# Patient Record
Sex: MALE | Race: WHITE | NOT HISPANIC OR LATINO | URBAN - METROPOLITAN AREA
[De-identification: names, ages, dates, MRNs, and addresses within clinical notes are randomized per-mention and may not be internally consistent; named-entity substitution may affect disease eponyms.]

---

## 2017-01-28 ENCOUNTER — COMMUNICATION - HEALTHEAST (OUTPATIENT)
Dept: NEUROLOGY | Facility: CLINIC | Age: 77
End: 2017-01-28

## 2017-01-28 DIAGNOSIS — G30.9 DEMENTIA OF THE ALZHEIMER'S TYPE (H): ICD-10-CM

## 2017-01-28 DIAGNOSIS — F02.80 DEMENTIA OF THE ALZHEIMER'S TYPE (H): ICD-10-CM

## 2017-04-30 ENCOUNTER — COMMUNICATION - HEALTHEAST (OUTPATIENT)
Dept: NEUROLOGY | Facility: CLINIC | Age: 77
End: 2017-04-30

## 2017-04-30 DIAGNOSIS — F43.22 ADJUSTMENT DISORDER WITH ANXIETY: ICD-10-CM

## 2017-06-15 ENCOUNTER — COMMUNICATION - HEALTHEAST (OUTPATIENT)
Dept: CARDIOLOGY | Facility: CLINIC | Age: 77
End: 2017-06-15

## 2017-06-25 ENCOUNTER — COMMUNICATION - HEALTHEAST (OUTPATIENT)
Dept: NEUROLOGY | Facility: CLINIC | Age: 77
End: 2017-06-25

## 2017-06-25 DIAGNOSIS — F43.22 ADJUSTMENT DISORDER WITH ANXIETY: ICD-10-CM

## 2017-07-07 ENCOUNTER — AMBULATORY - HEALTHEAST (OUTPATIENT)
Dept: CARDIOLOGY | Facility: CLINIC | Age: 77
End: 2017-07-07

## 2017-07-12 ENCOUNTER — COMMUNICATION - HEALTHEAST (OUTPATIENT)
Dept: TELEHEALTH | Facility: CLINIC | Age: 77
End: 2017-07-12

## 2017-07-12 ENCOUNTER — AMBULATORY - HEALTHEAST (OUTPATIENT)
Dept: CARDIOLOGY | Facility: CLINIC | Age: 77
End: 2017-07-12

## 2017-07-12 ENCOUNTER — OFFICE VISIT - HEALTHEAST (OUTPATIENT)
Dept: CARDIOLOGY | Facility: CLINIC | Age: 77
End: 2017-07-12

## 2017-07-12 DIAGNOSIS — G30.0 EARLY ONSET ALZHEIMER'S DEMENTIA WITHOUT BEHAVIORAL DISTURBANCE (H): ICD-10-CM

## 2017-07-12 DIAGNOSIS — E78.00 PURE HYPERCHOLESTEROLEMIA: ICD-10-CM

## 2017-07-12 DIAGNOSIS — F02.80 EARLY ONSET ALZHEIMER'S DEMENTIA WITHOUT BEHAVIORAL DISTURBANCE (H): ICD-10-CM

## 2017-07-12 DIAGNOSIS — I25.83 CORONARY ATHEROSCLEROSIS DUE TO LIPID RICH PLAQUE: ICD-10-CM

## 2017-07-12 DIAGNOSIS — I10 ESSENTIAL HYPERTENSION WITH GOAL BLOOD PRESSURE LESS THAN 130/85: ICD-10-CM

## 2017-07-12 RX ORDER — FINASTERIDE 5 MG/1
5 TABLET, FILM COATED ORAL DAILY
Status: SHIPPED | COMMUNITY
Start: 2017-07-12

## 2017-07-12 ASSESSMENT — MIFFLIN-ST. JEOR: SCORE: 1479.22

## 2017-08-14 ENCOUNTER — COMMUNICATION - HEALTHEAST (OUTPATIENT)
Dept: NEUROLOGY | Facility: CLINIC | Age: 77
End: 2017-08-14

## 2017-08-14 DIAGNOSIS — F43.22 ADJUSTMENT DISORDER WITH ANXIETY: ICD-10-CM

## 2017-08-18 ENCOUNTER — COMMUNICATION - HEALTHEAST (OUTPATIENT)
Dept: NEUROLOGY | Facility: CLINIC | Age: 77
End: 2017-08-18

## 2017-08-18 DIAGNOSIS — G30.9 DEMENTIA OF THE ALZHEIMER'S TYPE (H): ICD-10-CM

## 2017-08-18 DIAGNOSIS — F02.80 DEMENTIA OF THE ALZHEIMER'S TYPE (H): ICD-10-CM

## 2017-10-11 ENCOUNTER — COMMUNICATION - HEALTHEAST (OUTPATIENT)
Dept: NEUROLOGY | Facility: CLINIC | Age: 77
End: 2017-10-11

## 2017-10-11 DIAGNOSIS — F43.22 ADJUSTMENT DISORDER WITH ANXIETY: ICD-10-CM

## 2017-11-01 ENCOUNTER — HOSPITAL ENCOUNTER (OUTPATIENT)
Dept: NEUROLOGY | Facility: CLINIC | Age: 77
Setting detail: THERAPIES SERIES
Discharge: STILL ADMITTED OR ADMINISTRATIVE | End: 2017-11-01
Attending: NURSE PRACTITIONER

## 2017-11-01 ENCOUNTER — HOSPITAL ENCOUNTER (OUTPATIENT)
Dept: OCCUPATIONAL THERAPY | Age: 77
Setting detail: THERAPIES SERIES
Discharge: STILL ADMITTED OR ADMINISTRATIVE | End: 2017-11-01
Attending: NURSE PRACTITIONER

## 2017-11-01 DIAGNOSIS — G30.0 EARLY ONSET ALZHEIMER'S DEMENTIA WITHOUT BEHAVIORAL DISTURBANCE (H): ICD-10-CM

## 2017-11-01 DIAGNOSIS — F33.40 RECURRENT MAJOR DEPRESSIVE DISORDER, IN REMISSION (H): ICD-10-CM

## 2017-11-01 DIAGNOSIS — F02.80 EARLY ONSET ALZHEIMER'S DEMENTIA WITHOUT BEHAVIORAL DISTURBANCE (H): ICD-10-CM

## 2017-11-01 DIAGNOSIS — R41.89 COGNITIVE IMPAIRMENT: ICD-10-CM

## 2017-11-01 DIAGNOSIS — R79.89 LOW VITAMIN D LEVEL: ICD-10-CM

## 2017-11-01 DIAGNOSIS — R41.0 CONFUSED: ICD-10-CM

## 2017-11-01 DIAGNOSIS — R79.89 LOW VITAMIN B12 LEVEL: ICD-10-CM

## 2017-11-01 DIAGNOSIS — F41.9 ANXIETY: ICD-10-CM

## 2017-11-14 ENCOUNTER — HOSPITAL ENCOUNTER (OUTPATIENT)
Dept: MRI IMAGING | Facility: CLINIC | Age: 77
Discharge: HOME | End: 2017-11-14
Attending: NURSE PRACTITIONER

## 2017-11-14 DIAGNOSIS — R41.0 CONFUSED: ICD-10-CM

## 2017-11-16 ENCOUNTER — COMMUNICATION - HEALTHEAST (OUTPATIENT)
Dept: NEUROLOGY | Facility: CLINIC | Age: 77
End: 2017-11-16

## 2017-11-16 DIAGNOSIS — G30.9 DEMENTIA OF THE ALZHEIMER'S TYPE (H): ICD-10-CM

## 2017-11-16 DIAGNOSIS — F02.80 DEMENTIA OF THE ALZHEIMER'S TYPE (H): ICD-10-CM

## 2017-11-17 ENCOUNTER — COMMUNICATION - HEALTHEAST (OUTPATIENT)
Dept: NEUROLOGY | Facility: CLINIC | Age: 77
End: 2017-11-17

## 2017-11-17 ENCOUNTER — AMBULATORY - HEALTHEAST (OUTPATIENT)
Dept: NEUROLOGY | Facility: CLINIC | Age: 77
End: 2017-11-17

## 2017-11-17 DIAGNOSIS — R41.0 CONFUSED: ICD-10-CM

## 2017-11-17 DIAGNOSIS — F43.23 ADJUSTMENT DISORDER WITH MIXED ANXIETY AND DEPRESSED MOOD: ICD-10-CM

## 2017-11-20 ENCOUNTER — COMMUNICATION - HEALTHEAST (OUTPATIENT)
Dept: NEUROLOGY | Facility: CLINIC | Age: 77
End: 2017-11-20

## 2017-11-20 DIAGNOSIS — R41.0 CONFUSED: ICD-10-CM

## 2017-12-12 ENCOUNTER — HOSPITAL ENCOUNTER (OUTPATIENT)
Dept: NEUROLOGY | Facility: CLINIC | Age: 77
Setting detail: THERAPIES SERIES
Discharge: STILL ADMITTED OR ADMINISTRATIVE | End: 2017-12-12
Attending: NURSE PRACTITIONER

## 2017-12-12 DIAGNOSIS — F33.40 RECURRENT MAJOR DEPRESSIVE DISORDER, IN REMISSION (H): ICD-10-CM

## 2017-12-12 DIAGNOSIS — F02.80 EARLY ONSET ALZHEIMER'S DEMENTIA WITHOUT BEHAVIORAL DISTURBANCE (H): ICD-10-CM

## 2017-12-12 DIAGNOSIS — G30.0 EARLY ONSET ALZHEIMER'S DEMENTIA WITHOUT BEHAVIORAL DISTURBANCE (H): ICD-10-CM

## 2017-12-12 DIAGNOSIS — F41.9 ANXIETY: ICD-10-CM

## 2017-12-12 DIAGNOSIS — R79.89 LOW VITAMIN B12 LEVEL: ICD-10-CM

## 2017-12-12 DIAGNOSIS — R41.0 CONFUSED: ICD-10-CM

## 2018-01-01 ENCOUNTER — COMMUNICATION - HEALTHEAST (OUTPATIENT)
Dept: NEUROLOGY | Facility: CLINIC | Age: 78
End: 2018-01-01

## 2018-01-01 DIAGNOSIS — R41.0 CONFUSED: ICD-10-CM

## 2018-01-01 DIAGNOSIS — R79.89 LOW VITAMIN B12 LEVEL: ICD-10-CM

## 2018-01-02 ENCOUNTER — RECORDS - HEALTHEAST (OUTPATIENT)
Dept: LAB | Facility: CLINIC | Age: 78
End: 2018-01-02

## 2018-01-02 LAB
TSH SERPL DL<=0.005 MIU/L-ACNC: 1.66 UIU/ML (ref 0.3–5)
VIT B12 SERPL-MCNC: 1105 PG/ML (ref 213–816)

## 2018-01-03 LAB — BACTERIA SPEC CULT: NO GROWTH

## 2018-03-09 ENCOUNTER — COMMUNICATION - HEALTHEAST (OUTPATIENT)
Dept: NEUROLOGY | Facility: CLINIC | Age: 78
End: 2018-03-09

## 2018-03-09 DIAGNOSIS — G30.9 DEMENTIA OF THE ALZHEIMER'S TYPE (H): ICD-10-CM

## 2018-03-09 DIAGNOSIS — F02.80 DEMENTIA OF THE ALZHEIMER'S TYPE (H): ICD-10-CM

## 2018-04-18 ENCOUNTER — COMMUNICATION - HEALTHEAST (OUTPATIENT)
Dept: NEUROLOGY | Facility: CLINIC | Age: 78
End: 2018-04-18

## 2018-04-18 DIAGNOSIS — R41.0 CONFUSED: ICD-10-CM

## 2018-04-18 DIAGNOSIS — R79.89 LOW VITAMIN B12 LEVEL: ICD-10-CM

## 2018-04-18 DIAGNOSIS — F02.80 EARLY ONSET ALZHEIMER'S DEMENTIA WITHOUT BEHAVIORAL DISTURBANCE (H): ICD-10-CM

## 2018-04-18 DIAGNOSIS — G30.0 EARLY ONSET ALZHEIMER'S DEMENTIA WITHOUT BEHAVIORAL DISTURBANCE (H): ICD-10-CM

## 2018-05-07 ENCOUNTER — COMMUNICATION - HEALTHEAST (OUTPATIENT)
Dept: NEUROLOGY | Facility: CLINIC | Age: 78
End: 2018-05-07

## 2018-05-07 DIAGNOSIS — F43.22 ADJUSTMENT DISORDER WITH ANXIETY: ICD-10-CM

## 2018-06-05 ENCOUNTER — AMBULATORY - HEALTHEAST (OUTPATIENT)
Dept: NEUROLOGY | Facility: CLINIC | Age: 78
End: 2018-06-05

## 2018-06-05 ENCOUNTER — HOSPITAL ENCOUNTER (OUTPATIENT)
Dept: NEUROLOGY | Facility: CLINIC | Age: 78
Setting detail: THERAPIES SERIES
Discharge: STILL ADMITTED OR ADMINISTRATIVE | End: 2018-06-05
Attending: NURSE PRACTITIONER

## 2018-06-05 DIAGNOSIS — F02.80 EARLY ONSET ALZHEIMER'S DEMENTIA WITHOUT BEHAVIORAL DISTURBANCE (H): ICD-10-CM

## 2018-06-05 DIAGNOSIS — R79.89 LOW SERUM VITAMIN B2: ICD-10-CM

## 2018-06-05 DIAGNOSIS — R79.89 LOW VITAMIN B12 LEVEL: ICD-10-CM

## 2018-06-05 DIAGNOSIS — G30.0 EARLY ONSET ALZHEIMER'S DEMENTIA WITHOUT BEHAVIORAL DISTURBANCE (H): ICD-10-CM

## 2018-06-05 DIAGNOSIS — F33.40 RECURRENT MAJOR DEPRESSIVE DISORDER, IN REMISSION (H): ICD-10-CM

## 2018-06-05 DIAGNOSIS — F41.9 ANXIETY: ICD-10-CM

## 2018-06-05 LAB — VIT B12 SERPL-MCNC: 235 PG/ML (ref 213–816)

## 2018-08-27 ENCOUNTER — COMMUNICATION - HEALTHEAST (OUTPATIENT)
Dept: NEUROLOGY | Facility: CLINIC | Age: 78
End: 2018-08-27

## 2018-08-27 DIAGNOSIS — F02.80 DEMENTIA OF THE ALZHEIMER'S TYPE (H): ICD-10-CM

## 2018-08-27 DIAGNOSIS — G30.9 DEMENTIA OF THE ALZHEIMER'S TYPE (H): ICD-10-CM

## 2018-08-28 ENCOUNTER — RECORDS - HEALTHEAST (OUTPATIENT)
Dept: ADMINISTRATIVE | Facility: OTHER | Age: 78
End: 2018-08-28

## 2018-08-28 ENCOUNTER — AMBULATORY - HEALTHEAST (OUTPATIENT)
Dept: CARDIOLOGY | Facility: CLINIC | Age: 78
End: 2018-08-28

## 2018-08-30 ENCOUNTER — COMMUNICATION - HEALTHEAST (OUTPATIENT)
Dept: NEUROLOGY | Facility: CLINIC | Age: 78
End: 2018-08-30

## 2018-08-30 DIAGNOSIS — R79.89 LOW VITAMIN B12 LEVEL: ICD-10-CM

## 2018-08-30 DIAGNOSIS — F02.80 EARLY ONSET ALZHEIMER'S DEMENTIA WITHOUT BEHAVIORAL DISTURBANCE (H): ICD-10-CM

## 2018-08-30 DIAGNOSIS — G30.0 EARLY ONSET ALZHEIMER'S DEMENTIA WITHOUT BEHAVIORAL DISTURBANCE (H): ICD-10-CM

## 2018-08-30 DIAGNOSIS — R41.0 CONFUSED: ICD-10-CM

## 2018-08-31 ENCOUNTER — OFFICE VISIT - HEALTHEAST (OUTPATIENT)
Dept: CARDIOLOGY | Facility: CLINIC | Age: 78
End: 2018-08-31

## 2018-08-31 DIAGNOSIS — I10 ESSENTIAL HYPERTENSION: ICD-10-CM

## 2018-08-31 DIAGNOSIS — E78.00 PURE HYPERCHOLESTEROLEMIA: ICD-10-CM

## 2018-08-31 DIAGNOSIS — F02.80 EARLY ONSET ALZHEIMER'S DEMENTIA WITHOUT BEHAVIORAL DISTURBANCE (H): ICD-10-CM

## 2018-08-31 DIAGNOSIS — G30.0 EARLY ONSET ALZHEIMER'S DEMENTIA WITHOUT BEHAVIORAL DISTURBANCE (H): ICD-10-CM

## 2018-08-31 DIAGNOSIS — I25.83 CORONARY ATHEROSCLEROSIS DUE TO LIPID RICH PLAQUE: ICD-10-CM

## 2018-08-31 ASSESSMENT — MIFFLIN-ST. JEOR: SCORE: 1461.08

## 2018-09-26 ENCOUNTER — COMMUNICATION - HEALTHEAST (OUTPATIENT)
Dept: NEUROLOGY | Facility: CLINIC | Age: 78
End: 2018-09-26

## 2018-09-26 DIAGNOSIS — G30.9 DEMENTIA OF THE ALZHEIMER'S TYPE (H): ICD-10-CM

## 2018-09-26 DIAGNOSIS — F02.80 DEMENTIA OF THE ALZHEIMER'S TYPE (H): ICD-10-CM

## 2018-09-29 ENCOUNTER — COMMUNICATION - HEALTHEAST (OUTPATIENT)
Dept: NEUROLOGY | Facility: CLINIC | Age: 78
End: 2018-09-29

## 2018-09-29 DIAGNOSIS — G30.0 EARLY ONSET ALZHEIMER'S DEMENTIA WITHOUT BEHAVIORAL DISTURBANCE (H): ICD-10-CM

## 2018-09-29 DIAGNOSIS — R41.0 CONFUSED: ICD-10-CM

## 2018-09-29 DIAGNOSIS — R79.89 LOW VITAMIN B12 LEVEL: ICD-10-CM

## 2018-09-29 DIAGNOSIS — F02.80 EARLY ONSET ALZHEIMER'S DEMENTIA WITHOUT BEHAVIORAL DISTURBANCE (H): ICD-10-CM

## 2018-11-20 ENCOUNTER — COMMUNICATION - HEALTHEAST (OUTPATIENT)
Dept: NEUROLOGY | Facility: CLINIC | Age: 78
End: 2018-11-20

## 2018-11-20 DIAGNOSIS — F43.22 ADJUSTMENT DISORDER WITH ANXIETY: ICD-10-CM

## 2018-11-20 RX ORDER — VENLAFAXINE HYDROCHLORIDE 150 MG/1
150 CAPSULE, EXTENDED RELEASE ORAL DAILY
Qty: 90 CAPSULE | Refills: 1 | Status: SHIPPED | OUTPATIENT
Start: 2018-11-20

## 2019-01-08 ENCOUNTER — HOSPITAL ENCOUNTER (OUTPATIENT)
Dept: NEUROLOGY | Facility: CLINIC | Age: 79
Setting detail: THERAPIES SERIES
Discharge: STILL ADMITTED OR ADMINISTRATIVE | End: 2019-01-08
Attending: NURSE PRACTITIONER

## 2019-01-08 DIAGNOSIS — F02.80 EARLY ONSET ALZHEIMER'S DEMENTIA WITHOUT BEHAVIORAL DISTURBANCE (H): ICD-10-CM

## 2019-01-08 DIAGNOSIS — F41.9 ANXIETY: ICD-10-CM

## 2019-01-08 DIAGNOSIS — F33.40 RECURRENT MAJOR DEPRESSIVE DISORDER, IN REMISSION (H): ICD-10-CM

## 2019-01-08 DIAGNOSIS — G30.0 EARLY ONSET ALZHEIMER'S DEMENTIA WITHOUT BEHAVIORAL DISTURBANCE (H): ICD-10-CM

## 2019-02-04 ENCOUNTER — COMMUNICATION - HEALTHEAST (OUTPATIENT)
Dept: NEUROLOGY | Facility: CLINIC | Age: 79
End: 2019-02-04

## 2019-02-04 DIAGNOSIS — G30.0 EARLY ONSET ALZHEIMER'S DEMENTIA WITHOUT BEHAVIORAL DISTURBANCE (H): ICD-10-CM

## 2019-02-04 DIAGNOSIS — F02.80 EARLY ONSET ALZHEIMER'S DEMENTIA WITHOUT BEHAVIORAL DISTURBANCE (H): ICD-10-CM

## 2019-02-04 DIAGNOSIS — R41.0 CONFUSED: ICD-10-CM

## 2019-02-04 DIAGNOSIS — R79.89 LOW VITAMIN B12 LEVEL: ICD-10-CM

## 2019-02-15 ENCOUNTER — COMMUNICATION - HEALTHEAST (OUTPATIENT)
Dept: NEUROLOGY | Facility: CLINIC | Age: 79
End: 2019-02-15

## 2019-02-15 DIAGNOSIS — G30.9 DEMENTIA OF THE ALZHEIMER'S TYPE (H): ICD-10-CM

## 2019-02-15 DIAGNOSIS — F02.80 DEMENTIA OF THE ALZHEIMER'S TYPE (H): ICD-10-CM

## 2019-02-22 ENCOUNTER — COMMUNICATION - HEALTHEAST (OUTPATIENT)
Dept: NEUROSURGERY | Facility: CLINIC | Age: 79
End: 2019-02-22

## 2019-02-22 DIAGNOSIS — S06.5XAA SDH (SUBDURAL HEMATOMA) (H): ICD-10-CM

## 2019-02-26 ENCOUNTER — AMBULATORY - HEALTHEAST (OUTPATIENT)
Dept: CARDIOLOGY | Facility: CLINIC | Age: 79
End: 2019-02-26

## 2019-03-04 ENCOUNTER — AMBULATORY - HEALTHEAST (OUTPATIENT)
Dept: OTHER | Facility: CLINIC | Age: 79
End: 2019-03-04

## 2019-03-08 ENCOUNTER — HOSPITAL ENCOUNTER (OUTPATIENT)
Dept: CT IMAGING | Facility: CLINIC | Age: 79
Discharge: HOME | End: 2019-03-08
Attending: SURGERY

## 2019-03-08 ENCOUNTER — OFFICE VISIT - HEALTHEAST (OUTPATIENT)
Dept: NEUROSURGERY | Facility: CLINIC | Age: 79
End: 2019-03-08

## 2019-03-08 DIAGNOSIS — S06.5XAA SDH (SUBDURAL HEMATOMA) (H): ICD-10-CM

## 2019-03-08 ASSESSMENT — MIFFLIN-ST. JEOR: SCORE: 1395.31

## 2019-05-21 ENCOUNTER — APPOINTMENT (OUTPATIENT)
Age: 79
Setting detail: DERMATOLOGY
End: 2019-05-22

## 2019-05-21 VITALS — WEIGHT: 158 LBS | HEIGHT: 70 IN

## 2019-05-21 DIAGNOSIS — B35.1 TINEA UNGUIUM: ICD-10-CM

## 2019-05-21 DIAGNOSIS — L57.0 ACTINIC KERATOSIS: ICD-10-CM

## 2019-05-21 DIAGNOSIS — L82.1 OTHER SEBORRHEIC KERATOSIS: ICD-10-CM

## 2019-05-21 DIAGNOSIS — L71.8 OTHER ROSACEA: ICD-10-CM

## 2019-05-21 DIAGNOSIS — D22 MELANOCYTIC NEVI: ICD-10-CM

## 2019-05-21 DIAGNOSIS — Z85.828 PERSONAL HISTORY OF OTHER MALIGNANT NEOPLASM OF SKIN: ICD-10-CM

## 2019-05-21 PROBLEM — D22.5 MELANOCYTIC NEVI OF TRUNK: Status: ACTIVE | Noted: 2019-05-21

## 2019-05-21 PROCEDURE — OTHER PRESCRIPTION: OTHER

## 2019-05-21 PROCEDURE — OTHER MEDICATION COUNSELING: OTHER

## 2019-05-21 PROCEDURE — 99214 OFFICE O/P EST MOD 30 MIN: CPT

## 2019-05-21 PROCEDURE — OTHER COUNSELING: OTHER

## 2019-05-21 RX ORDER — FLUOROURACIL 50 MG/G
5% CREAM TOPICAL TWICE DAILY
Qty: 1 | Refills: 1 | Status: ERX | COMMUNITY
Start: 2019-05-21

## 2019-05-21 ASSESSMENT — LOCATION SIMPLE DESCRIPTION DERM
LOCATION SIMPLE: LEFT THIGH
LOCATION SIMPLE: LEFT FOREHEAD
LOCATION SIMPLE: RIGHT CHEEK
LOCATION SIMPLE: UPPER BACK
LOCATION SIMPLE: LEFT FOREHEAD
LOCATION SIMPLE: LEFT TEMPLE
LOCATION SIMPLE: LEFT CHEEK
LOCATION SIMPLE: LEFT GREAT TOE
LOCATION SIMPLE: SCALP
LOCATION SIMPLE: LEFT TEMPLE
LOCATION SIMPLE: RIGHT GREAT TOE
LOCATION SIMPLE: RIGHT UPPER BACK
LOCATION SIMPLE: LEFT CHEEK
LOCATION SIMPLE: RIGHT CHEEK
LOCATION SIMPLE: LEFT EAR

## 2019-05-21 ASSESSMENT — LOCATION DETAILED DESCRIPTION DERM
LOCATION DETAILED: LEFT INFERIOR CENTRAL MALAR CHEEK
LOCATION DETAILED: INFERIOR THORACIC SPINE
LOCATION DETAILED: LEFT CENTRAL MALAR CHEEK
LOCATION DETAILED: LEFT CENTRAL MALAR CHEEK
LOCATION DETAILED: LEFT INFERIOR CENTRAL MALAR CHEEK
LOCATION DETAILED: LEFT INFERIOR LATERAL FOREHEAD
LOCATION DETAILED: LEFT ANTIHELIX
LOCATION DETAILED: RIGHT MEDIAL MANDIBULAR CHEEK
LOCATION DETAILED: LEFT SUPERIOR FOREHEAD
LOCATION DETAILED: LEFT SUPERIOR LATERAL FOREHEAD
LOCATION DETAILED: LEFT SUPERIOR LATERAL FOREHEAD
LOCATION DETAILED: LEFT INFERIOR LATERAL FOREHEAD
LOCATION DETAILED: RIGHT INFERIOR CENTRAL MALAR CHEEK
LOCATION DETAILED: LEFT GREAT TOENAIL
LOCATION DETAILED: LEFT INFERIOR TEMPLE
LOCATION DETAILED: RIGHT SUPERIOR FRONTAL SCALP
LOCATION DETAILED: LEFT ANTERIOR PROXIMAL THIGH
LOCATION DETAILED: RIGHT CENTRAL MALAR CHEEK
LOCATION DETAILED: RIGHT DISTAL PLANTAR GREAT TOE
LOCATION DETAILED: RIGHT INFERIOR MEDIAL BUCCAL CHEEK
LOCATION DETAILED: RIGHT INFERIOR CENTRAL MALAR CHEEK
LOCATION DETAILED: RIGHT CENTRAL MALAR CHEEK
LOCATION DETAILED: LEFT INFERIOR TEMPLE
LOCATION DETAILED: LEFT SUPERIOR FOREHEAD
LOCATION DETAILED: RIGHT SUPERIOR UPPER BACK

## 2019-05-21 ASSESSMENT — LOCATION ZONE DERM
LOCATION ZONE: FACE
LOCATION ZONE: FACE
LOCATION ZONE: EAR
LOCATION ZONE: LEG
LOCATION ZONE: SCALP
LOCATION ZONE: TOENAIL
LOCATION ZONE: TOE
LOCATION ZONE: TRUNK

## 2019-05-21 NOTE — PROCEDURE: COUNSELING
Detail Level: Detailed
Patient Specific Counseling (Will Not Stick From Patient To Patient): Will defer tmt for now as the patient has Alzheimer’s.
Detail Level: Generalized
Detail Level: Zone

## 2019-05-21 NOTE — PROCEDURE: MEDICATION COUNSELING
Dupixent Pregnancy And Lactation Text: This medication likely crosses the placenta but the risk for the fetus is uncertain. This medication is excreted in breast milk.
Topical Retinoid counseling:  Patient advised to apply a pea-sized amount only at bedtime and wait 30 minutes after washing their face before applying.  If too drying, patient may add a non-comedogenic moisturizer. The patient verbalized understanding of the proper use and possible adverse effects of retinoids.  All of the patient's questions and concerns were addressed.
Humira Counseling:  I discussed with the patient the risks of adalimumab including but not limited to myelosuppression, immunosuppression, autoimmune hepatitis, demyelinating diseases, lymphoma, and serious infections.  The patient understands that monitoring is required including a PPD at baseline and must alert us or the primary physician if symptoms of infection or other concerning signs are noted.
Cephalexin Pregnancy And Lactation Text: This medication is Pregnancy Category B and considered safe during pregnancy.  It is also excreted in breast milk but can be used safely for shorter doses.
Tetracycline Pregnancy And Lactation Text: This medication is Pregnancy Category D and not consider safe during pregnancy. It is also excreted in breast milk.
Erivedge Counseling- I discussed with the patient the risks of Erivedge including but not limited to nausea, vomiting, diarrhea, constipation, weight loss, changes in the sense of taste, decreased appetite, muscle spasms, and hair loss.  The patient verbalized understanding of the proper use and possible adverse effects of Erivedge.  All of the patient's questions and concerns were addressed.
Topical Sulfur Applications Counseling: Topical Sulfur Counseling: Patient counseled that this medication may cause skin irritation or allergic reactions.  In the event of skin irritation, the patient was advised to reduce the amount of the drug applied or use it less frequently.   The patient verbalized understanding of the proper use and possible adverse effects of topical sulfur application.  All of the patient's questions and concerns were addressed.
Azathioprine Counseling:  I discussed with the patient the risks of azathioprine including but not limited to myelosuppression, immunosuppression, hepatotoxicity, lymphoma, and infections.  The patient understands that monitoring is required including baseline LFTs, Creatinine, possible TPMP genotyping and weekly CBCs for the first month and then every 2 weeks thereafter.  The patient verbalized understanding of the proper use and possible adverse effects of azathioprine.  All of the patient's questions and concerns were addressed.
Thalidomide Pregnancy And Lactation Text: This medication is Pregnancy Category X and is absolutely contraindicated during pregnancy. It is unknown if it is excreted in breast milk.
Ivermectin Counseling:  Patient instructed to take medication on an empty stomach with a full glass of water.  Patient informed of potential adverse effects including but not limited to nausea, diarrhea, dizziness, itching, and swelling of the extremities or lymph nodes.  The patient verbalized understanding of the proper use and possible adverse effects of ivermectin.  All of the patient's questions and concerns were addressed.
Griseofulvin Pregnancy And Lactation Text: This medication is Pregnancy Category X and is known to cause serious birth defects. It is unknown if this medication is excreted in breast milk but breast feeding should be avoided.
Cellcept Counseling:  I discussed with the patient the risks of mycophenolate mofetil including but not limited to infection/immunosuppression, GI upset, hypokalemia, hypercholesterolemia, bone marrow suppression, lymphoproliferative disorders, malignancy, GI ulceration/bleed/perforation, colitis, interstitial lung disease, kidney failure, progressive multifocal leukoencephalopathy, and birth defects.  The patient understands that monitoring is required including a baseline creatinine and regular CBC testing. In addition, patient must alert us immediately if symptoms of infection or other concerning signs are noted.
5-Fu Counseling: 5-Fluorouracil Counseling:  I discussed with the patient the risks of 5-fluorouracil including but not limited to erythema, scaling, itching, weeping, crusting, and pain.
Elidel Pregnancy And Lactation Text: This medication is Pregnancy Category C. It is unknown if this medication is excreted in breast milk.
Picato Counseling:  I discussed with the patient the risks of Picato including but not limited to erythema, scaling, itching, weeping, crusting, and pain.
Include Pregnancy/Lactation Warning?: No
Protopic Pregnancy And Lactation Text: This medication is Pregnancy Category C. It is unknown if this medication is excreted in breast milk when applied topically.
Cimzia Pregnancy And Lactation Text: This medication crosses the placenta but can be considered safe in certain situations. Cimzia may be excreted in breast milk.
Humira Pregnancy And Lactation Text: This medication is Pregnancy Category B and is considered safe during pregnancy. It is unknown if this medication is excreted in breast milk.
Xolair Counseling:  Patient informed of potential adverse effects including but not limited to fever, muscle aches, rash and allergic reactions.  The patient verbalized understanding of the proper use and possible adverse effects of Xolair.  All of the patient's questions and concerns were addressed.
Prednisone Pregnancy And Lactation Text: This medication is Pregnancy Category C and it isn't know if it is safe during pregnancy. This medication is excreted in breast milk.
Tazorac Pregnancy And Lactation Text: This medication is not safe during pregnancy. It is unknown if this medication is excreted in breast milk.
Imiquimod Counseling:  I discussed with the patient the risks of imiquimod including but not limited to erythema, scaling, itching, weeping, crusting, and pain.  Patient understands that the inflammatory response to imiquimod is variable from person to person and was educated regarded proper titration schedule.  If flu-like symptoms develop, patient knows to discontinue the medication and contact us.
Drysol Counseling:  I discussed with the patient the risks of drysol/aluminum chloride including but not limited to skin rash, itching, irritation, burning.
Rifampin Pregnancy And Lactation Text: This medication is Pregnancy Category C and it isn't know if it is safe during pregnancy. It is also excreted in breast milk and should not be used if you are breast feeding.
Oxybutynin Counseling:  I discussed with the patient the risks of oxybutynin including but not limited to skin rash, drowsiness, dry mouth, difficulty urinating, and blurred vision.
Elidel Counseling: Patient may experience a mild burning sensation during topical application. Elidel is not approved in children less than 2 years of age. There have been case reports of hematologic and skin malignancies in patients using topical calcineurin inhibitors although causality is questionable.
Minoxidil Counseling: Minoxidil is a topical medication which can increase blood flow where it is applied. It is uncertain how this medication increases hair growth. Side effects are uncommon and include stinging and allergic reactions.
Ketoconazole Counseling:   Patient counseled regarding improving absorption with orange juice.  Adverse effects include but are not limited to breast enlargement, headache, diarrhea, nausea, upset stomach, liver function test abnormalities, taste disturbance, and stomach pain.  There is a rare possibility of liver failure that can occur when taking ketoconazole. The patient understands that monitoring of LFTs may be required, especially at baseline. The patient verbalized understanding of the proper use and possible adverse effects of ketoconazole.  All of the patient's questions and concerns were addressed.
Xelholdenz Pregnancy And Lactation Text: This medication is Pregnancy Category D and is not considered safe during pregnancy.  The risk during breast feeding is also uncertain.
High Dose Vitamin A Counseling: Side effects reviewed, pt to contact office should one occur.
Spironolactone Pregnancy And Lactation Text: This medication can cause feminization of the male fetus and should be avoided during pregnancy. The active metabolite is also found in breast milk.
Quinolones Counseling:  I discussed with the patient the risks of fluoroquinolones including but not limited to GI upset, allergic reaction, drug rash, diarrhea, dizziness, photosensitivity, yeast infections, liver function test abnormalities, tendonitis/tendon rupture.
Methotrexate Counseling:  Patient counseled regarding adverse effects of methotrexate including but not limited to nausea, vomiting, abnormalities in liver function tests. Patients may develop mouth sores, rash, diarrhea, and abnormalities in blood counts. The patient understands that monitoring is required including LFT's and blood counts.  There is a rare possibility of scarring of the liver and lung problems that can occur when taking methotrexate. Persistent nausea, loss of appetite, pale stools, dark urine, cough, and shortness of breath should be reported immediately. Patient advised to discontinue methotrexate treatment at least three months before attempting to become pregnant.  I discussed the need for folate supplements while taking methotrexate.  These supplements can decrease side effects during methotrexate treatment. The patient verbalized understanding of the proper use and possible adverse effects of methotrexate.  All of the patient's questions and concerns were addressed.
Sski Pregnancy And Lactation Text: This medication is Pregnancy Category D and isn't considered safe during pregnancy. It is excreted in breast milk.
Methotrexate Pregnancy And Lactation Text: This medication is Pregnancy Category X and is known to cause fetal harm. This medication is excreted in breast milk.
Hydroxyzine Pregnancy And Lactation Text: This medication is not safe during pregnancy and should not be taken. It is also excreted in breast milk and breast feeding isn't recommended.
Bactrim Counseling:  I discussed with the patient the risks of sulfa antibiotics including but not limited to GI upset, allergic reaction, drug rash, diarrhea, dizziness, photosensitivity, and yeast infections.  Rarely, more serious reactions can occur including but not limited to aplastic anemia, agranulocytosis, methemoglobinemia, blood dyscrasias, liver or kidney failure, lung infiltrates or desquamative/blistering drug rashes.
Otezla Pregnancy And Lactation Text: This medication is Pregnancy Category C and it isn't known if it is safe during pregnancy. It is unknown if it is excreted in breast milk.
Simponi Pregnancy And Lactation Text: The risk during pregnancy and breastfeeding is uncertain with this medication.
Albendazole Pregnancy And Lactation Text: This medication is Pregnancy Category C and it isn't known if it is safe during pregnancy. It is also excreted in breast milk.
Clofazimine Counseling:  I discussed with the patient the risks of clofazimine including but not limited to skin and eye pigmentation, liver damage, nausea/vomiting, gastrointestinal bleeding and allergy.
Minocycline Counseling: Patient advised regarding possible photosensitivity and discoloration of the teeth, skin, lips, tongue and gums.  Patient instructed to avoid sunlight, if possible.  When exposed to sunlight, patients should wear protective clothing, sunglasses, and sunscreen.  The patient was instructed to call the office immediately if the following severe adverse effects occur:  hearing changes, easy bruising/bleeding, severe headache, or vision changes.  The patient verbalized understanding of the proper use and possible adverse effects of minocycline.  All of the patient's questions and concerns were addressed.
Quinolones Pregnancy And Lactation Text: This medication is Pregnancy Category C and it isn't know if it is safe during pregnancy. It is also excreted in breast milk.
Glycopyrrolate Counseling:  I discussed with the patient the risks of glycopyrrolate including but not limited to skin rash, drowsiness, dry mouth, difficulty urinating, and blurred vision.
Itraconazole Counseling:  I discussed with the patient the risks of itraconazole including but not limited to liver damage, nausea/vomiting, neuropathy, and severe allergy.  The patient understands that this medication is best absorbed when taken with acidic beverages such as non-diet cola or ginger ale.  The patient understands that monitoring is required including baseline LFTs and repeat LFTs at intervals.  The patient understands that they are to contact us or the primary physician if concerning signs are noted.
Mirvaso Pregnancy And Lactation Text: This medication has not been assigned a Pregnancy Risk Category. It is unknown if the medication is excreted in breast milk.
Terbinafine Pregnancy And Lactation Text: This medication is Pregnancy Category B and is considered safe during pregnancy. It is also excreted in breast milk and breast feeding isn't recommended.
Rituxan Pregnancy And Lactation Text: This medication is Pregnancy Category C and it isn't know if it is safe during pregnancy. It is unknown if this medication is excreted in breast milk but similar antibodies are known to be excreted.
Enbrel Counseling:  I discussed with the patient the risks of etanercept including but not limited to myelosuppression, immunosuppression, autoimmune hepatitis, demyelinating diseases, lymphoma, and infections.  The patient understands that monitoring is required including a PPD at baseline and must alert us or the primary physician if symptoms of infection or other concerning signs are noted.
Metronidazole Counseling:  I discussed with the patient the risks of metronidazole including but not limited to seizures, nausea/vomiting, a metallic taste in the mouth, nausea/vomiting and severe allergy.
Hydroxyzine Counseling: Patient advised that the medication is sedating and not to drive a car after taking this medication.  Patient informed of potential adverse effects including but not limited to dry mouth, urinary retention, and blurry vision.  The patient verbalized understanding of the proper use and possible adverse effects of hydroxyzine.  All of the patient's questions and concerns were addressed.
Valtrex Counseling: I discussed with the patient the risks of valacyclovir including but not limited to kidney damage, nausea, vomiting and severe allergy.  The patient understands that if the infection seems to be worsening or is not improving, they are to call.
Birth Control Pills Pregnancy And Lactation Text: This medication should be avoided if pregnant and for the first 30 days post-partum.
Bexarotene Pregnancy And Lactation Text: This medication is Pregnancy Category X and should not be given to women who are pregnant or may become pregnant. This medication should not be used if you are breast feeding.
Acitretin Counseling:  I discussed with the patient the risks of acitretin including but not limited to hair loss, dry lips/skin/eyes, liver damage, hyperlipidemia, depression/suicidal ideation, photosensitivity.  Serious rare side effects can include but are not limited to pancreatitis, pseudotumor cerebri, bony changes, clot formation/stroke/heart attack.  Patient understands that alcohol is contraindicated since it can result in liver toxicity and significantly prolong the elimination of the drug by many years.
Taltz Counseling: I discussed with the patient the risks of ixekizumab including but not limited to immunosuppression, serious infections, worsening of inflammatory bowel disease and drug reactions.  The patient understands that monitoring is required including a PPD at baseline and must alert us or the primary physician if symptoms of infection or other concerning signs are noted.
Solaraze Pregnancy And Lactation Text: This medication is Pregnancy Category B and is considered safe. There is some data to suggest avoiding during the third trimester. It is unknown if this medication is excreted in breast milk.
Hydroquinone Counseling:  Patient advised that medication may result in skin irritation, lightening (hypopigmentation), dryness, and burning.  In the event of skin irritation, the patient was advised to reduce the amount of the drug applied or use it less frequently.  Rarely, spots that are treated with hydroquinone can become darker (pseudoochronosis).  Should this occur, patient instructed to stop medication and call the office. The patient verbalized understanding of the proper use and possible adverse effects of hydroquinone.  All of the patient's questions and concerns were addressed.
Otezla Counseling: The side effects of Otezla were discussed with the patient, including but not limited to worsening or new depression, weight loss, diarrhea, nausea, upper respiratory tract infection, and headache. Patient instructed to call the office should any adverse effect occur.  The patient verbalized understanding of the proper use and possible adverse effects of Otezla.  All the patient's questions and concerns were addressed.
Benzoyl Peroxide Counseling: Patient counseled that medicine may cause skin irritation and bleach clothing.  In the event of skin irritation, the patient was advised to reduce the amount of the drug applied or use it less frequently.   The patient verbalized understanding of the proper use and possible adverse effects of benzoyl peroxide.  All of the patient's questions and concerns were addressed.
Odomzo Counseling- I discussed with the patient the risks of Odomzo including but not limited to nausea, vomiting, diarrhea, constipation, weight loss, changes in the sense of taste, decreased appetite, muscle spasms, and hair loss.  The patient verbalized understanding of the proper use and possible adverse effects of Odomzo.  All of the patient's questions and concerns were addressed.
Spironolactone Counseling: Patient advised regarding risks of diarrhea, abdominal pain, hyperkalemia, birth defects (for female patients), liver toxicity and renal toxicity. The patient may need blood work to monitor liver and kidney function and potassium levels while on therapy. The patient verbalized understanding of the proper use and possible adverse effects of spironolactone.  All of the patient's questions and concerns were addressed.
Tazorac Counseling:  Patient advised that medication is irritating and drying.  Patient may need to apply sparingly and wash off after an hour before eventually leaving it on overnight.  The patient verbalized understanding of the proper use and possible adverse effects of tazorac.  All of the patient's questions and concerns were addressed.
Cyclophosphamide Counseling:  I discussed with the patient the risks of cyclophosphamide including but not limited to hair loss, hormonal abnormalities, decreased fertility, abdominal pain, diarrhea, nausea and vomiting, bone marrow suppression and infection. The patient understands that monitoring is required while taking this medication.
High Dose Vitamin A Pregnancy And Lactation Text: High dose vitamin A therapy is contraindicated during pregnancy and breast feeding.
Cephalexin Counseling: I counseled the patient regarding use of cephalexin as an antibiotic for prophylactic and/or therapeutic purposes. Cephalexin (commonly prescribed under brand name Keflex) is a cephalosporin antibiotic which is active against numerous classes of bacteria, including most skin bacteria. Side effects may include nausea, diarrhea, gastrointestinal upset, rash, hives, yeast infections, and in rare cases, hepatitis, kidney disease, seizures, fever, confusion, neurologic symptoms, and others. Patients with severe allergies to penicillin medications are cautioned that there is about a 10% incidence of cross-reactivity with cephalosporins. When possible, patients with penicillin allergies should use alternatives to cephalosporins for antibiotic therapy.
Wartpeel Counseling:  I discussed with the patient the risks of Wartpeel including but not limited to erythema, scaling, itching, weeping, crusting, and pain.
Valtrex Pregnancy And Lactation Text: this medication is Pregnancy Category B and is considered safe during pregnancy. This medication is not directly found in breast milk but it's metabolite acyclovir is present.
Niacinamide Pregnancy And Lactation Text: These medications are considered safe during pregnancy.
Isotretinoin Pregnancy And Lactation Text: This medication is Pregnancy Category X and is considered extremely dangerous during pregnancy. It is unknown if it is excreted in breast milk.
Clindamycin Counseling: I counseled the patient regarding use of clindamycin as an antibiotic for prophylactic and/or therapeutic purposes. Clindamycin is active against numerous classes of bacteria, including skin bacteria. Side effects may include nausea, diarrhea, gastrointestinal upset, rash, hives, yeast infections, and in rare cases, colitis.
Topical Clindamycin Counseling: Patient counseled that this medication may cause skin irritation or allergic reactions.  In the event of skin irritation, the patient was advised to reduce the amount of the drug applied or use it less frequently.   The patient verbalized understanding of the proper use and possible adverse effects of clindamycin.  All of the patient's questions and concerns were addressed.
Topical Sulfur Applications Pregnancy And Lactation Text: This medication is Pregnancy Category C and has an unknown safety profile during pregnancy. It is unknown if this topical medication is excreted in breast milk.
Ilumya Counseling: I discussed with the patient the risks of tildrakizumab including but not limited to immunosuppression, malignancy, posterior leukoencephalopathy syndrome, and serious infections.  The patient understands that monitoring is required including a PPD at baseline and must alert us or the primary physician if symptoms of infection or other concerning signs are noted.
Azithromycin Counseling:  I discussed with the patient the risks of azithromycin including but not limited to GI upset, allergic reaction, drug rash, diarrhea, and yeast infections.
Bexarotene Counseling:  I discussed with the patient the risks of bexarotene including but not limited to hair loss, dry lips/skin/eyes, liver abnormalities, hyperlipidemia, pancreatitis, depression/suicidal ideation, photosensitivity, drug rash/allergic reactions, hypothyroidism, anemia, leukopenia, infection, cataracts, and teratogenicity.  Patient understands that they will need regular blood tests to check lipid profile, liver function tests, white blood cell count, thyroid function tests and pregnancy test if applicable.
Niacinamide Counseling: I recommended taking niacin or niacinamide, also know as vitamin B3, twice daily. Recent evidence suggests that taking vitamin B3 (500 mg twice daily) can reduce the risk of actinic keratoses and non-melanoma skin cancers. Side effects of vitamin B3 include flushing and headache.
Opioid Pregnancy And Lactation Text: These medications can lead to premature delivery and should be avoided during pregnancy. These medications are also present in breast milk in small amounts.
Infliximab Counseling:  I discussed with the patient the risks of infliximab including but not limited to myelosuppression, immunosuppression, autoimmune hepatitis, demyelinating diseases, lymphoma, and serious infections.  The patient understands that monitoring is required including a PPD at baseline and must alert us or the primary physician if symptoms of infection or other concerning signs are noted.
Protopic Counseling: Patient may experience a mild burning sensation during topical application. Protopic is not approved in children less than 2 years of age. There have been case reports of hematologic and skin malignancies in patients using topical calcineurin inhibitors although causality is questionable.
Metronidazole Pregnancy And Lactation Text: This medication is Pregnancy Category B and considered safe during pregnancy.  It is also excreted in breast milk.
Doxycycline Counseling:  Patient counseled regarding possible photosensitivity and increased risk for sunburn.  Patient instructed to avoid sunlight, if possible.  When exposed to sunlight, patients should wear protective clothing, sunglasses, and sunscreen.  The patient was instructed to call the office immediately if the following severe adverse effects occur:  hearing changes, easy bruising/bleeding, severe headache, or vision changes.  The patient verbalized understanding of the proper use and possible adverse effects of doxycycline.  All of the patient's questions and concerns were addressed.
Azathioprine Pregnancy And Lactation Text: This medication is Pregnancy Category D and isn't considered safe during pregnancy. It is unknown if this medication is excreted in breast milk.
Carac Counseling:  I discussed with the patient the risks of Carac including but not limited to erythema, scaling, itching, weeping, crusting, and pain.
Doxepin Counseling:  Patient advised that the medication is sedating and not to drive a car after taking this medication. Patient informed of potential adverse effects including but not limited to dry mouth, urinary retention, and blurry vision.  The patient verbalized understanding of the proper use and possible adverse effects of doxepin.  All of the patient's questions and concerns were addressed.
Azithromycin Pregnancy And Lactation Text: This medication is considered safe during pregnancy and is also secreted in breast milk.
Hydroquinone Pregnancy And Lactation Text: This medication has not been assigned a Pregnancy Risk Category but animal studies failed to show danger with the topical medication. It is unknown if the medication is excreted in breast milk.
Thalidomide Counseling: I discussed with the patient the risks of thalidomide including but not limited to birth defects, anxiety, weakness, chest pain, dizziness, cough and severe allergy.
Cimzia Counseling:  I discussed with the patient the risks of Cimzia including but not limited to immunosuppression, allergic reactions and infections.  The patient understands that monitoring is required including a PPD at baseline and must alert us or the primary physician if symptoms of infection or other concerning signs are noted.
Gabapentin Counseling: I discussed with the patient the risks of gabapentin including but not limited to dizziness, somnolence, fatigue and ataxia.
Topical Clindamycin Pregnancy And Lactation Text: This medication is Pregnancy Category B and is considered safe during pregnancy. It is unknown if it is excreted in breast milk.
Stelara Counseling:  I discussed with the patient the risks of ustekinumab including but not limited to immunosuppression, malignancy, posterior leukoencephalopathy syndrome, and serious infections.  The patient understands that monitoring is required including a PPD at baseline and must alert us or the primary physician if symptoms of infection or other concerning signs are noted.
Xeljanz Counseling: I discussed with the patient the risks of Xeljanz therapy including increased risk of infection, liver issues, headache, diarrhea, or cold symptoms. Live vaccines should be avoided. They were instructed to call if they have any problems.
Griseofulvin Counseling:  I discussed with the patient the risks of griseofulvin including but not limited to photosensitivity, cytopenia, liver damage, nausea/vomiting and severe allergy.  The patient understands that this medication is best absorbed when taken with a fatty meal (e.g., ice cream or french fries).
Drysol Pregnancy And Lactation Text: This medication is considered safe during pregnancy and breast feeding.
Hydroxychloroquine Pregnancy And Lactation Text: This medication has been shown to cause fetal harm but it isn't assigned a Pregnancy Risk Category. There are small amounts excreted in breast milk.
Tremfya Counseling: I discussed with the patient the risks of guselkumab including but not limited to immunosuppression, serious infections, worsening of inflammatory bowel disease and drug reactions.  The patient understands that monitoring is required including a PPD at baseline and must alert us or the primary physician if symptoms of infection or other concerning signs are noted.
Prednisone Counseling:  I discussed with the patient the risks of prolonged use of prednisone including but not limited to weight gain, insomnia, osteoporosis, mood changes, diabetes, susceptibility to infection, glaucoma and high blood pressure.  In cases where prednisone use is prolonged, patients should be monitored with blood pressure checks, serum glucose levels and an eye exam.  Additionally, the patient may need to be placed on GI prophylaxis, PCP prophylaxis, and calcium and vitamin D supplementation and/or a bisphosphonate.  The patient verbalized understanding of the proper use and the possible adverse effects of prednisone.  All of the patient's questions and concerns were addressed.
Siliq Counseling:  I discussed with the patient the risks of Siliq including but not limited to new or worsening depression, suicidal thoughts and behavior, immunosuppression, malignancy, posterior leukoencephalopathy syndrome, and serious infections.  The patient understands that monitoring is required including a PPD at baseline and must alert us or the primary physician if symptoms of infection or other concerning signs are noted. There is also a special program designed to monitor depression which is required with Siliq.
Albendazole Counseling:  I discussed with the patient the risks of albendazole including but not limited to cytopenia, kidney damage, nausea/vomiting and severe allergy.  The patient understands that this medication is being used in an off-label manner.
Cyclosporine Counseling:  I discussed with the patient the risks of cyclosporine including but not limited to hypertension, gingival hyperplasia,myelosuppression, immunosuppression, liver damage, kidney damage, neurotoxicity, lymphoma, and serious infections. The patient understands that monitoring is required including baseline blood pressure, CBC, CMP, lipid panel and uric acid, and then 1-2 times monthly CMP and blood pressure.
Benzoyl Peroxide Pregnancy And Lactation Text: This medication is Pregnancy Category C. It is unknown if benzoyl peroxide is excreted in breast milk.
Gabapentin Pregnancy And Lactation Text: This medication is Pregnancy Category C and isn't considered safe during pregnancy. It is excreted in breast milk.
Erythromycin Pregnancy And Lactation Text: This medication is Pregnancy Category B and is considered safe during pregnancy. It is also excreted in breast milk.
Eucrisa Counseling: Patient may experience a mild burning sensation during topical application. Eucrisa is not approved in children less than 2 years of age.
Opioid Counseling: I discussed with the patient the potential side effects of opioids including but not limited to addiction, altered mental status, and depression. I stressed avoiding alcohol, benzodiazepines, muscle relaxants and sleep aids unless specifically okayed by a physician. The patient verbalized understanding of the proper use and possible adverse effects of opioids. All of the patient's questions and concerns were addressed. They were instructed to flush the remaining pills down the toilet if they did not need them for pain.
Doxepin Pregnancy And Lactation Text: This medication is Pregnancy Category C and it isn't known if it is safe during pregnancy. It is also excreted in breast milk and breast feeding isn't recommended.
Wartpeel Pregnancy And Lactation Text: This medication is Pregnancy Category X and contraindicated in pregnancy and in women who may become pregnant. It is unknown if this medication is excreted in breast milk.
Fluconazole Counseling:  Patient counseled regarding adverse effects of fluconazole including but not limited to headache, diarrhea, nausea, upset stomach, liver function test abnormalities, taste disturbance, and stomach pain.  There is a rare possibility of liver failure that can occur when taking fluconazole.  The patient understands that monitoring of LFTs and kidney function test may be required, especially at baseline. The patient verbalized understanding of the proper use and possible adverse effects of fluconazole.  All of the patient's questions and concerns were addressed.
Cyclophosphamide Pregnancy And Lactation Text: This medication is Pregnancy Category D and it isn't considered safe during pregnancy. This medication is excreted in breast milk.
Arava Counseling:  Patient counseled regarding adverse effects of Arava including but not limited to nausea, vomiting, abnormalities in liver function tests. Patients may develop mouth sores, rash, diarrhea, and abnormalities in blood counts. The patient understands that monitoring is required including LFTs and blood counts.  There is a rare possibility of scarring of the liver and lung problems that can occur when taking methotrexate. Persistent nausea, loss of appetite, pale stools, dark urine, cough, and shortness of breath should be reported immediately. Patient advised to discontinue Arava treatment and consult with a physician prior to attempting conception. The patient will have to undergo a treatment to eliminate Arava from the body prior to conception.
Rituxan Counseling:  I discussed with the patient the risks of Rituxan infusions. Side effects can include infusion reactions, severe drug rashes including mucocutaneous reactions, reactivation of latent hepatitis and other infections and rarely progressive multifocal leukoencephalopathy.  All of the patient's questions and concerns were addressed.
Hydroxychloroquine Counseling:  I discussed with the patient that a baseline ophthalmologic exam is needed at the start of therapy and every year thereafter while on therapy. A CBC may also be warranted for monitoring.  The side effects of this medication were discussed with the patient, including but not limited to agranulocytosis, aplastic anemia, seizures, rashes, retinopathy, and liver toxicity. Patient instructed to call the office should any adverse effect occur.  The patient verbalized understanding of the proper use and possible adverse effects of Plaquenil.  All the patient's questions and concerns were addressed.
Erythromycin Counseling:  I discussed with the patient the risks of erythromycin including but not limited to GI upset, allergic reaction, drug rash, diarrhea, increase in liver enzymes, and yeast infections.
Xolair Pregnancy And Lactation Text: This medication is Pregnancy Category B and is considered safe during pregnancy. This medication is excreted in breast milk.
Isotretinoin Counseling: Patient should get monthly blood tests, not donate blood, not drive at night if vision affected, not share medication, and not undergo elective surgery for 6 months after tx completed. Side effects reviewed, pt to contact office should one occur.
Cosentyx Counseling:  I discussed with the patient the risks of Cosentyx including but not limited to worsening of Crohn's disease, immunosuppression, allergic reactions and infections.  The patient understands that monitoring is required including a PPD at baseline and must alert us or the primary physician if symptoms of infection or other concerning signs are noted.
Tetracycline Counseling: Patient counseled regarding possible photosensitivity and increased risk for sunburn.  Patient instructed to avoid sunlight, if possible.  When exposed to sunlight, patients should wear protective clothing, sunglasses, and sunscreen.  The patient was instructed to call the office immediately if the following severe adverse effects occur:  hearing changes, easy bruising/bleeding, severe headache, or vision changes.  The patient verbalized understanding of the proper use and possible adverse effects of tetracycline.  All of the patient's questions and concerns were addressed. Patient understands to avoid pregnancy while on therapy due to potential birth defects.
Zyclara Counseling:  I discussed with the patient the risks of imiquimod including but not limited to erythema, scaling, itching, weeping, crusting, and pain.  Patient understands that the inflammatory response to imiquimod is variable from person to person and was educated regarded proper titration schedule.  If flu-like symptoms develop, patient knows to discontinue the medication and contact us.
Doxycycline Pregnancy And Lactation Text: This medication is Pregnancy Category D and not consider safe during pregnancy. It is also excreted in breast milk but is considered safe for shorter treatment courses.
SSKI Counseling:  I discussed with the patient the risks of SSKI including but not limited to thyroid abnormalities, metallic taste, GI upset, fever, headache, acne, arthralgias, paraesthesias, lymphadenopathy, easy bleeding, arrhythmias, and allergic reaction.
Mirvaso Counseling: Mirvaso is a topical medication which can decrease superficial blood flow where applied. Side effects are uncommon and include stinging, redness and allergic reactions.
Nsaids Pregnancy And Lactation Text: These medications are considered safe up to 30 weeks gestation. It is excreted in breast milk.
Bactrim Pregnancy And Lactation Text: This medication is Pregnancy Category D and is known to cause fetal risk.  It is also excreted in breast milk.
Ketoconazole Pregnancy And Lactation Text: This medication is Pregnancy Category C and it isn't know if it is safe during pregnancy. It is also excreted in breast milk and breast feeding isn't recommended.
Cimetidine Counseling:  I discussed with the patient the risks of Cimetidine including but not limited to gynecomastia, headache, diarrhea, nausea, drowsiness, arrhythmias, pancreatitis, skin rashes, psychosis, bone marrow suppression and kidney toxicity.
Acitretin Pregnancy And Lactation Text: This medication is Pregnancy Category X and should not be given to women who are pregnant or may become pregnant in the future. This medication is excreted in breast milk.
Detail Level: Detailed
Simponi Counseling:  I discussed with the patient the risks of golimumab including but not limited to myelosuppression, immunosuppression, autoimmune hepatitis, demyelinating diseases, lymphoma, and serious infections.  The patient understands that monitoring is required including a PPD at baseline and must alert us or the primary physician if symptoms of infection or other concerning signs are noted.
Rifampin Counseling: I discussed with the patient the risks of rifampin including but not limited to liver damage, kidney damage, red-orange body fluids, nausea/vomiting and severe allergy.
Clindamycin Pregnancy And Lactation Text: This medication can be used in pregnancy if certain situations. Clindamycin is also present in breast milk.
Glycopyrrolate Pregnancy And Lactation Text: This medication is Pregnancy Category B and is considered safe during pregnancy. It is unknown if it is excreted breast milk.
Nsaids Counseling: NSAID Counseling: I discussed with the patient that NSAIDs should be taken with food. Prolonged use of NSAIDs can result in the development of stomach ulcers.  Patient advised to stop taking NSAIDs if abdominal pain occurs.  The patient verbalized understanding of the proper use and possible adverse effects of NSAIDs.  All of the patient's questions and concerns were addressed.
Birth Control Pills Counseling: Birth Control Pill Counseling: I discussed with the patient the potential side effects of OCPs including but not limited to increased risk of stroke, heart attack, thrombophlebitis, deep venous thrombosis, hepatic adenomas, breast changes, GI upset, headaches, and depression.  The patient verbalized understanding of the proper use and possible adverse effects of OCPs. All of the patient's questions and concerns were addressed.
Rhofade Counseling: Rhofade is a topical medication which can decrease superficial blood flow where applied. Side effects are uncommon and include stinging, redness and allergic reactions.
Dupixent Counseling: I discussed with the patient the risks of dupilumab including but not limited to eye infection and irritation, cold sores, injection site reactions, worsening of asthma, allergic reactions and increased risk of parasitic infection.  Live vaccines should be avoided while taking dupilumab. Dupilumab will also interact with certain medications such as warfarin and cyclosporine. The patient understands that monitoring is required and they must alert us or the primary physician if symptoms of infection or other concerning signs are noted.
Dapsone Counseling: I discussed with the patient the risks of dapsone including but not limited to hemolytic anemia, agranulocytosis, rashes, methemoglobinemia, kidney failure, peripheral neuropathy, headaches, GI upset, and liver toxicity.  Patients who start dapsone require monitoring including baseline LFTs and weekly CBCs for the first month, then every month thereafter.  The patient verbalized understanding of the proper use and possible adverse effects of dapsone.  All of the patient's questions and concerns were addressed.
Colchicine Counseling:  Patient counseled regarding adverse effects including but not limited to stomach upset (nausea, vomiting, stomach pain, or diarrhea).  Patient instructed to limit alcohol consumption while taking this medication.  Colchicine may reduce blood counts especially with prolonged use.  The patient understands that monitoring of kidney function and blood counts may be required, especially at baseline. The patient verbalized understanding of the proper use and possible adverse effects of colchicine.  All of the patient's questions and concerns were addressed.
Solaraze Counseling:  I discussed with the patient the risks of Solaraze including but not limited to erythema, scaling, itching, weeping, crusting, and pain.
Terbinafine Counseling: Patient counseling regarding adverse effects of terbinafine including but not limited to headache, diarrhea, rash, upset stomach, liver function test abnormalities, itching, taste/smell disturbance, nausea, abdominal pain, and flatulence.  There is a rare possibility of liver failure that can occur when taking terbinafine.  The patient understands that a baseline LFT and kidney function test may be required. The patient verbalized understanding of the proper use and possible adverse effects of terbinafine.  All of the patient's questions and concerns were addressed.
Dapsone Pregnancy And Lactation Text: This medication is Pregnancy Category C and is not considered safe during pregnancy or breast feeding.

## 2019-06-12 ENCOUNTER — COMMUNICATION - HEALTHEAST (OUTPATIENT)
Dept: NEUROLOGY | Facility: CLINIC | Age: 79
End: 2019-06-12

## 2019-06-12 DIAGNOSIS — G30.0 EARLY ONSET ALZHEIMER'S DEMENTIA WITHOUT BEHAVIORAL DISTURBANCE (H): ICD-10-CM

## 2019-06-12 DIAGNOSIS — R79.89 LOW VITAMIN B12 LEVEL: ICD-10-CM

## 2019-06-12 DIAGNOSIS — R41.0 CONFUSED: ICD-10-CM

## 2019-06-12 DIAGNOSIS — F02.80 EARLY ONSET ALZHEIMER'S DEMENTIA WITHOUT BEHAVIORAL DISTURBANCE (H): ICD-10-CM

## 2019-06-13 ENCOUNTER — COMMUNICATION - HEALTHEAST (OUTPATIENT)
Dept: NEUROLOGY | Facility: CLINIC | Age: 79
End: 2019-06-13

## 2019-06-13 DIAGNOSIS — G30.0 EARLY ONSET ALZHEIMER'S DEMENTIA WITHOUT BEHAVIORAL DISTURBANCE (H): ICD-10-CM

## 2019-06-13 DIAGNOSIS — R79.89 LOW VITAMIN B12 LEVEL: ICD-10-CM

## 2019-06-13 DIAGNOSIS — F02.80 EARLY ONSET ALZHEIMER'S DEMENTIA WITHOUT BEHAVIORAL DISTURBANCE (H): ICD-10-CM

## 2019-06-13 DIAGNOSIS — R41.0 CONFUSED: ICD-10-CM

## 2019-07-09 ENCOUNTER — HOME CARE/HOSPICE - HEALTHEAST (OUTPATIENT)
Dept: HOME HEALTH SERVICES | Facility: HOME HEALTH | Age: 79
End: 2019-07-09

## 2019-07-09 ENCOUNTER — HOSPITAL ENCOUNTER (OUTPATIENT)
Dept: NEUROLOGY | Facility: CLINIC | Age: 79
Setting detail: THERAPIES SERIES
Discharge: STILL ADMITTED OR ADMINISTRATIVE | End: 2019-07-09
Attending: NURSE PRACTITIONER

## 2019-07-09 DIAGNOSIS — F41.9 ANXIETY: ICD-10-CM

## 2019-07-09 DIAGNOSIS — G30.0 EARLY ONSET ALZHEIMER'S DEMENTIA WITHOUT BEHAVIORAL DISTURBANCE (H): ICD-10-CM

## 2019-07-09 DIAGNOSIS — F33.40 RECURRENT MAJOR DEPRESSIVE DISORDER, IN REMISSION (H): ICD-10-CM

## 2019-07-09 DIAGNOSIS — F03.90 MAJOR NEUROCOGNITIVE DISORDER (H): ICD-10-CM

## 2019-07-09 DIAGNOSIS — F02.80 EARLY ONSET ALZHEIMER'S DEMENTIA WITHOUT BEHAVIORAL DISTURBANCE (H): ICD-10-CM

## 2019-07-09 RX ORDER — DONEPEZIL HYDROCHLORIDE 5 MG/1
TABLET, FILM COATED ORAL
Status: SHIPPED | COMMUNITY
Start: 2019-06-14

## 2019-07-10 ENCOUNTER — COMMUNICATION - HEALTHEAST (OUTPATIENT)
Dept: HOME HEALTH SERVICES | Facility: HOME HEALTH | Age: 79
End: 2019-07-10

## 2019-07-16 ENCOUNTER — HOME CARE/HOSPICE - HEALTHEAST (OUTPATIENT)
Dept: HOME HEALTH SERVICES | Facility: HOME HEALTH | Age: 79
End: 2019-07-16

## 2019-07-18 ENCOUNTER — HOME CARE/HOSPICE - HEALTHEAST (OUTPATIENT)
Dept: HOME HEALTH SERVICES | Facility: HOME HEALTH | Age: 79
End: 2019-07-18

## 2019-07-25 ENCOUNTER — HOME CARE/HOSPICE - HEALTHEAST (OUTPATIENT)
Dept: HOME HEALTH SERVICES | Facility: HOME HEALTH | Age: 79
End: 2019-07-25

## 2019-07-29 ENCOUNTER — HOME CARE/HOSPICE - HEALTHEAST (OUTPATIENT)
Dept: HOME HEALTH SERVICES | Facility: HOME HEALTH | Age: 79
End: 2019-07-29

## 2019-07-31 ENCOUNTER — HOME CARE/HOSPICE - HEALTHEAST (OUTPATIENT)
Dept: HOME HEALTH SERVICES | Facility: HOME HEALTH | Age: 79
End: 2019-07-31

## 2019-09-11 ENCOUNTER — COMMUNICATION - HEALTHEAST (OUTPATIENT)
Dept: NEUROLOGY | Facility: CLINIC | Age: 79
End: 2019-09-11

## 2019-11-19 ENCOUNTER — COMMUNICATION - HEALTHEAST (OUTPATIENT)
Dept: NEUROLOGY | Facility: CLINIC | Age: 79
End: 2019-11-19

## 2019-11-19 DIAGNOSIS — G30.0 EARLY ONSET ALZHEIMER'S DEMENTIA WITHOUT BEHAVIORAL DISTURBANCE (H): ICD-10-CM

## 2019-11-19 DIAGNOSIS — F41.9 ANXIETY: ICD-10-CM

## 2019-11-19 DIAGNOSIS — F33.40 RECURRENT MAJOR DEPRESSIVE DISORDER, IN REMISSION (H): ICD-10-CM

## 2019-11-19 DIAGNOSIS — F02.80 EARLY ONSET ALZHEIMER'S DEMENTIA WITHOUT BEHAVIORAL DISTURBANCE (H): ICD-10-CM

## 2020-01-08 ENCOUNTER — HOSPITAL ENCOUNTER (OUTPATIENT)
Dept: NEUROLOGY | Facility: CLINIC | Age: 80
Setting detail: THERAPIES SERIES
Discharge: STILL ADMITTED OR ADMINISTRATIVE | End: 2020-01-08
Attending: NURSE PRACTITIONER

## 2020-01-08 DIAGNOSIS — F02.80 EARLY ONSET ALZHEIMER'S DEMENTIA WITHOUT BEHAVIORAL DISTURBANCE (H): ICD-10-CM

## 2020-01-08 DIAGNOSIS — F41.9 ANXIETY: ICD-10-CM

## 2020-01-08 DIAGNOSIS — G30.0 EARLY ONSET ALZHEIMER'S DEMENTIA WITHOUT BEHAVIORAL DISTURBANCE (H): ICD-10-CM

## 2020-01-08 DIAGNOSIS — F03.90 MAJOR NEUROCOGNITIVE DISORDER (H): ICD-10-CM

## 2020-01-08 DIAGNOSIS — F33.40 RECURRENT MAJOR DEPRESSIVE DISORDER, IN REMISSION (H): ICD-10-CM

## 2020-03-25 ENCOUNTER — COMMUNICATION - HEALTHEAST (OUTPATIENT)
Dept: NEUROLOGY | Facility: CLINIC | Age: 80
End: 2020-03-25

## 2020-03-25 DIAGNOSIS — F02.80 EARLY ONSET ALZHEIMER'S DEMENTIA WITHOUT BEHAVIORAL DISTURBANCE (H): ICD-10-CM

## 2020-03-25 DIAGNOSIS — F41.9 ANXIETY: ICD-10-CM

## 2020-03-25 DIAGNOSIS — G30.0 EARLY ONSET ALZHEIMER'S DEMENTIA WITHOUT BEHAVIORAL DISTURBANCE (H): ICD-10-CM

## 2020-03-26 RX ORDER — LORAZEPAM 0.5 MG/1
TABLET ORAL
Qty: 25 TABLET | Refills: 0 | Status: SHIPPED | OUTPATIENT
Start: 2020-03-26

## 2020-04-22 ENCOUNTER — HOSPITAL ENCOUNTER (OUTPATIENT)
Dept: NEUROLOGY | Facility: CLINIC | Age: 80
Setting detail: THERAPIES SERIES
Discharge: STILL ADMITTED OR ADMINISTRATIVE | End: 2020-04-22
Attending: NURSE PRACTITIONER

## 2020-04-22 DIAGNOSIS — R41.0 CONFUSION: ICD-10-CM

## 2020-04-22 DIAGNOSIS — G30.0 EARLY ONSET ALZHEIMER'S DEMENTIA WITHOUT BEHAVIORAL DISTURBANCE (H): ICD-10-CM

## 2020-04-22 DIAGNOSIS — F41.1 GENERALIZED ANXIETY DISORDER: ICD-10-CM

## 2020-04-22 DIAGNOSIS — F02.80 EARLY ONSET ALZHEIMER'S DEMENTIA WITHOUT BEHAVIORAL DISTURBANCE (H): ICD-10-CM

## 2020-04-22 DIAGNOSIS — M25.60 STIFFNESS IN JOINT: ICD-10-CM

## 2020-04-22 DIAGNOSIS — G20.C PARKINSONISM, UNSPECIFIED PARKINSONISM TYPE (H): ICD-10-CM

## 2020-04-22 DIAGNOSIS — F03.90 MAJOR NEUROCOGNITIVE DISORDER (H): ICD-10-CM

## 2020-04-22 DIAGNOSIS — F41.9 ANXIETY: ICD-10-CM

## 2020-04-22 DIAGNOSIS — F33.40 RECURRENT MAJOR DEPRESSIVE DISORDER, IN REMISSION (H): ICD-10-CM

## 2020-05-07 ENCOUNTER — HOSPITAL ENCOUNTER (OUTPATIENT)
Dept: NEUROLOGY | Facility: CLINIC | Age: 80
Setting detail: THERAPIES SERIES
Discharge: STILL ADMITTED OR ADMINISTRATIVE | End: 2020-05-07
Attending: NURSE PRACTITIONER

## 2020-05-07 DIAGNOSIS — G30.0 EARLY ONSET ALZHEIMER'S DEMENTIA WITHOUT BEHAVIORAL DISTURBANCE (H): ICD-10-CM

## 2020-05-07 DIAGNOSIS — F41.9 ANXIETY: ICD-10-CM

## 2020-05-07 DIAGNOSIS — F33.40 RECURRENT MAJOR DEPRESSIVE DISORDER, IN REMISSION (H): ICD-10-CM

## 2020-05-07 DIAGNOSIS — F03.90 MAJOR NEUROCOGNITIVE DISORDER (H): ICD-10-CM

## 2020-05-07 DIAGNOSIS — F02.80 EARLY ONSET ALZHEIMER'S DEMENTIA WITHOUT BEHAVIORAL DISTURBANCE (H): ICD-10-CM

## 2020-05-22 ENCOUNTER — COMMUNICATION - HEALTHEAST (OUTPATIENT)
Dept: NEUROLOGY | Facility: CLINIC | Age: 80
End: 2020-05-22

## 2020-05-22 DIAGNOSIS — G30.0 EARLY ONSET ALZHEIMER'S DEMENTIA WITHOUT BEHAVIORAL DISTURBANCE (H): ICD-10-CM

## 2020-05-22 DIAGNOSIS — F02.80 EARLY ONSET ALZHEIMER'S DEMENTIA WITHOUT BEHAVIORAL DISTURBANCE (H): ICD-10-CM

## 2020-05-22 DIAGNOSIS — F33.40 RECURRENT MAJOR DEPRESSIVE DISORDER, IN REMISSION (H): ICD-10-CM

## 2020-05-22 DIAGNOSIS — F41.9 ANXIETY: ICD-10-CM

## 2020-06-29 ENCOUNTER — COMMUNICATION - HEALTHEAST (OUTPATIENT)
Dept: NEUROLOGY | Facility: CLINIC | Age: 80
End: 2020-06-29

## 2020-06-29 DIAGNOSIS — F41.9 ANXIETY: ICD-10-CM

## 2020-06-29 DIAGNOSIS — G30.0 EARLY ONSET ALZHEIMER'S DEMENTIA WITHOUT BEHAVIORAL DISTURBANCE (H): ICD-10-CM

## 2020-06-29 DIAGNOSIS — F33.40 RECURRENT MAJOR DEPRESSIVE DISORDER, IN REMISSION (H): ICD-10-CM

## 2020-06-29 DIAGNOSIS — F02.80 EARLY ONSET ALZHEIMER'S DEMENTIA WITHOUT BEHAVIORAL DISTURBANCE (H): ICD-10-CM

## 2020-07-15 ENCOUNTER — HOSPITAL ENCOUNTER (OUTPATIENT)
Dept: NEUROLOGY | Facility: CLINIC | Age: 80
Setting detail: THERAPIES SERIES
Discharge: STILL ADMITTED OR ADMINISTRATIVE | End: 2020-07-15
Attending: NURSE PRACTITIONER

## 2020-07-15 DIAGNOSIS — R41.840 ATTENTION AND CONCENTRATION DEFICIT: ICD-10-CM

## 2020-07-15 DIAGNOSIS — F41.9 ANXIETY: ICD-10-CM

## 2020-07-15 DIAGNOSIS — G30.0 EARLY ONSET ALZHEIMER'S DEMENTIA WITHOUT BEHAVIORAL DISTURBANCE (H): ICD-10-CM

## 2020-07-15 DIAGNOSIS — F03.90 MAJOR NEUROCOGNITIVE DISORDER (H): ICD-10-CM

## 2020-07-15 DIAGNOSIS — F02.80 EARLY ONSET ALZHEIMER'S DEMENTIA WITHOUT BEHAVIORAL DISTURBANCE (H): ICD-10-CM

## 2020-07-15 DIAGNOSIS — F33.40 RECURRENT MAJOR DEPRESSIVE DISORDER, IN REMISSION (H): ICD-10-CM

## 2020-07-16 RX ORDER — METHYLPHENIDATE HYDROCHLORIDE 5 MG/1
5 TABLET ORAL 2 TIMES DAILY
Qty: 60 TABLET | Refills: 0 | Status: SHIPPED | OUTPATIENT
Start: 2020-07-16

## 2020-07-30 ENCOUNTER — COMMUNICATION - HEALTHEAST (OUTPATIENT)
Dept: NEUROLOGY | Facility: CLINIC | Age: 80
End: 2020-07-30

## 2020-07-30 DIAGNOSIS — F02.80 EARLY ONSET ALZHEIMER'S DEMENTIA WITHOUT BEHAVIORAL DISTURBANCE (H): ICD-10-CM

## 2020-07-30 DIAGNOSIS — F33.40 RECURRENT MAJOR DEPRESSIVE DISORDER, IN REMISSION (H): ICD-10-CM

## 2020-07-30 DIAGNOSIS — G30.0 EARLY ONSET ALZHEIMER'S DEMENTIA WITHOUT BEHAVIORAL DISTURBANCE (H): ICD-10-CM

## 2020-07-30 DIAGNOSIS — F41.9 ANXIETY: ICD-10-CM

## 2020-09-01 ENCOUNTER — COMMUNICATION - HEALTHEAST (OUTPATIENT)
Dept: NEUROLOGY | Facility: CLINIC | Age: 80
End: 2020-09-01

## 2020-09-01 DIAGNOSIS — F33.40 RECURRENT MAJOR DEPRESSIVE DISORDER, IN REMISSION (H): ICD-10-CM

## 2020-09-01 DIAGNOSIS — F41.9 ANXIETY: ICD-10-CM

## 2020-09-01 DIAGNOSIS — F02.80 EARLY ONSET ALZHEIMER'S DEMENTIA WITHOUT BEHAVIORAL DISTURBANCE (H): ICD-10-CM

## 2020-09-01 DIAGNOSIS — G30.0 EARLY ONSET ALZHEIMER'S DEMENTIA WITHOUT BEHAVIORAL DISTURBANCE (H): ICD-10-CM

## 2020-10-06 ENCOUNTER — COMMUNICATION - HEALTHEAST (OUTPATIENT)
Dept: NEUROLOGY | Facility: CLINIC | Age: 80
End: 2020-10-06

## 2020-10-06 DIAGNOSIS — F02.80 EARLY ONSET ALZHEIMER'S DEMENTIA WITHOUT BEHAVIORAL DISTURBANCE (H): ICD-10-CM

## 2020-10-06 DIAGNOSIS — F33.40 RECURRENT MAJOR DEPRESSIVE DISORDER, IN REMISSION (H): ICD-10-CM

## 2020-10-06 DIAGNOSIS — G30.0 EARLY ONSET ALZHEIMER'S DEMENTIA WITHOUT BEHAVIORAL DISTURBANCE (H): ICD-10-CM

## 2020-10-06 DIAGNOSIS — F41.9 ANXIETY: ICD-10-CM

## 2020-10-13 ENCOUNTER — COMMUNICATION - HEALTHEAST (OUTPATIENT)
Dept: NEUROLOGY | Facility: CLINIC | Age: 80
End: 2020-10-13

## 2020-10-14 ENCOUNTER — HOSPITAL ENCOUNTER (OUTPATIENT)
Dept: NEUROLOGY | Facility: CLINIC | Age: 80
Setting detail: THERAPIES SERIES
Discharge: STILL ADMITTED OR ADMINISTRATIVE | End: 2020-10-14
Attending: NURSE PRACTITIONER

## 2020-10-14 DIAGNOSIS — F41.9 ANXIETY: ICD-10-CM

## 2020-10-14 DIAGNOSIS — F33.40 RECURRENT MAJOR DEPRESSIVE DISORDER, IN REMISSION (H): ICD-10-CM

## 2020-10-14 DIAGNOSIS — R41.840 ATTENTION AND CONCENTRATION DEFICIT: ICD-10-CM

## 2020-10-14 DIAGNOSIS — G30.0 EARLY ONSET ALZHEIMER'S DEMENTIA WITHOUT BEHAVIORAL DISTURBANCE (H): ICD-10-CM

## 2020-10-14 DIAGNOSIS — R41.0 CONFUSION: ICD-10-CM

## 2020-10-14 DIAGNOSIS — Z91.81 AT HIGH RISK FOR FALLS: ICD-10-CM

## 2020-10-14 DIAGNOSIS — F02.80 EARLY ONSET ALZHEIMER'S DEMENTIA WITHOUT BEHAVIORAL DISTURBANCE (H): ICD-10-CM

## 2020-10-14 DIAGNOSIS — R26.81 GAIT INSTABILITY: ICD-10-CM

## 2020-10-14 ASSESSMENT — MIFFLIN-ST. JEOR: SCORE: 1458.82

## 2020-11-10 ENCOUNTER — HOSPITAL ENCOUNTER (OUTPATIENT)
Dept: NEUROLOGY | Facility: CLINIC | Age: 80
Setting detail: THERAPIES SERIES
Discharge: STILL ADMITTED OR ADMINISTRATIVE | End: 2020-11-10
Attending: NURSE PRACTITIONER

## 2020-11-10 DIAGNOSIS — G47.00 INSOMNIA, UNSPECIFIED TYPE: ICD-10-CM

## 2020-11-10 DIAGNOSIS — Z79.899 MEDICATION MANAGEMENT: ICD-10-CM

## 2020-11-10 DIAGNOSIS — F41.9 ANXIETY: ICD-10-CM

## 2020-11-10 DIAGNOSIS — F02.80 EARLY ONSET ALZHEIMER'S DEMENTIA WITHOUT BEHAVIORAL DISTURBANCE (H): ICD-10-CM

## 2020-11-10 DIAGNOSIS — F33.40 RECURRENT MAJOR DEPRESSIVE DISORDER, IN REMISSION (H): ICD-10-CM

## 2020-11-10 DIAGNOSIS — F03.90 MAJOR NEUROCOGNITIVE DISORDER (H): ICD-10-CM

## 2020-11-10 DIAGNOSIS — G30.0 EARLY ONSET ALZHEIMER'S DEMENTIA WITHOUT BEHAVIORAL DISTURBANCE (H): ICD-10-CM

## 2020-11-10 DIAGNOSIS — Z91.81 RISK FOR FALLS: ICD-10-CM

## 2020-11-10 RX ORDER — QUETIAPINE FUMARATE 25 MG/1
25 TABLET, FILM COATED ORAL AT BEDTIME
Qty: 90 TABLET | Refills: 1 | Status: SHIPPED | OUTPATIENT
Start: 2020-11-10

## 2020-11-10 ASSESSMENT — MIFFLIN-ST. JEOR: SCORE: 1452.01

## 2020-11-14 ENCOUNTER — COMMUNICATION - HEALTHEAST (OUTPATIENT)
Dept: NEUROLOGY | Facility: CLINIC | Age: 80
End: 2020-11-14

## 2020-11-14 DIAGNOSIS — F33.40 RECURRENT MAJOR DEPRESSIVE DISORDER, IN REMISSION (H): ICD-10-CM

## 2020-11-14 DIAGNOSIS — F02.80 EARLY ONSET ALZHEIMER'S DEMENTIA WITHOUT BEHAVIORAL DISTURBANCE (H): ICD-10-CM

## 2020-11-14 DIAGNOSIS — F41.9 ANXIETY: ICD-10-CM

## 2020-11-14 DIAGNOSIS — G30.0 EARLY ONSET ALZHEIMER'S DEMENTIA WITHOUT BEHAVIORAL DISTURBANCE (H): ICD-10-CM

## 2020-11-24 ENCOUNTER — COMMUNICATION - HEALTHEAST (OUTPATIENT)
Dept: NEUROLOGY | Facility: CLINIC | Age: 80
End: 2020-11-24

## 2020-12-31 ENCOUNTER — COMMUNICATION - HEALTHEAST (OUTPATIENT)
Dept: NEUROLOGY | Facility: CLINIC | Age: 80
End: 2020-12-31

## 2020-12-31 DIAGNOSIS — F41.9 ANXIETY: ICD-10-CM

## 2020-12-31 DIAGNOSIS — G30.0 EARLY ONSET ALZHEIMER'S DEMENTIA WITHOUT BEHAVIORAL DISTURBANCE (H): ICD-10-CM

## 2020-12-31 DIAGNOSIS — F33.40 RECURRENT MAJOR DEPRESSIVE DISORDER, IN REMISSION (H): ICD-10-CM

## 2020-12-31 DIAGNOSIS — F02.80 EARLY ONSET ALZHEIMER'S DEMENTIA WITHOUT BEHAVIORAL DISTURBANCE (H): ICD-10-CM

## 2020-12-31 RX ORDER — CLONAZEPAM 0.5 MG/1
TABLET ORAL
Qty: 60 TABLET | Refills: 0 | Status: SHIPPED | OUTPATIENT
Start: 2020-12-31

## 2021-05-29 ENCOUNTER — RECORDS - HEALTHEAST (OUTPATIENT)
Dept: ADMINISTRATIVE | Facility: CLINIC | Age: 81
End: 2021-05-29

## 2021-05-30 NOTE — PROGRESS NOTES
.  Assessment:     1. Dementia of the Alzheimer type.  2. Depression  3. Anxiety    Plan:     1. Continue with Klonopin  2. Ativan PRN  3. Return to clinic in 6 months    Patient presents to the Memory loss clinic for a follow up visit, he was last seen by me on 19, no medication changes were made.  Wife does report patient has had increased anxiety a few days.  A long discussion was held with the wife and patient about Alzheimer's and treatment plans.  Wife prefers to keep patient at home.  I will give the patient as needed Ativan for wife to use when patient anxiety becomes extreme.  Overall wife reports patient is doing well.  Handicap sticker will also be filled out for patient.    Subjective:          He is a 78 y.o. male who initially presented to the memory loss clinic on 11 for evaluation of his cognitive and behavioral and functioning condition.  The patient's wife reports that the patient has demonstrated progressive changes in cognition, function and behavior dating back at least 3 years.  3 years prior the wife realized that the patient may be having trouble cognitively at the patient's father's  he appeared to be confused about family relationships.  His wife found this to be quite striking and began noticing that the patient was also having trouble finding his way while driving to familiar locations and to be having some trouble with forgetting elements of conversation and direction.  At that time the patient does admit that he felt he was having trouble remembering the names of the children of close friends.  By  problems have progressed to the point where the [facility]  An MRI of the brain revealed mild generalized cortical atrophy as well as a left posterior temporal cortical CVA.  Neuro psychometric testing revealed but would best be described as multiple domain MCI with problems and some prefrontal functions as well as new  learning.  Wife was not convinced that the patient's problems had progressed, however, she does agree that she had been assuming more responsibilities in the home and had been adjusting to the patient's perceived deficits.  Over the past year the patient had developed a habit of consuming 2-4 alcoholic beverages per day which was quite unusual for the patient who never consumed alcohol but rarely in the past.  Dr. Dotson's patient at that time was dementia, likely dementia of the Alzheimer's type.  Rule out vascular dementia.  Multiple vascular risk factors.  Rule out depression.  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  Likely multifactorial in etiology, including vascular ischemic changes noted on the MRI.  He also had neuro imaging in September 2011, which did not reveal any significant changes, although was positive for an infarct noted in the posterior lateral left temporal lobe, which was thought to be a small chronic cortical infarct but stable since 2009.  9/28/11 the patient was started on Aricept.     Outside reports reviewed: None.       Patient Active Problem List    Diagnosis Date Noted     Intracranial hemorrhage (H) 02/21/2019     SDH (subdural hematoma) (H) 02/21/2019     Benign non-nodular prostatic hyperplasia with lower urinary tract symptoms 09/08/2016     Anxiety 07/19/2014     Depression 07/19/2014     Dementia of the Alzheimer's type 07/19/2014     Essential Hypercholesterolemia      Hypertension      Coronary Artery Disease      No past medical history on file.  No past surgical history on file.  No family history on file.  Current Outpatient Medications   Medication Sig Dispense Refill     atorvastatin (LIPITOR) 20 MG tablet Take 20 mg by mouth bedtime.       cholecalciferol, vitamin D3, (VITAMIN D3)  1,000 unit capsule Take 1 capsule (1,000 Units total) by mouth daily.  0     clonazePAM (KLONOPIN) 0.5 MG tablet Take 0.25 mg by mouth 2 (two) times a day.       clonazePAM (KLONOPIN) 0.5 MG tablet TAKE 1/2 TABLET BY MOUTH TWICE A DAY 60 tablet 3     coenzyme Q10 (COQ-10) 100 mg capsule Take 1 capsule (100 mg total) by mouth daily.  0     cyanocobalamin, vitamin B-12, (VITAMIN B-12) 1,000 mcg Subl Place 1 tablet (1,000 mcg total) under the tongue daily. 30 tablet 6     donepezil (ARICEPT) 5 MG tablet        finasteride (PROSCAR) 5 mg tablet Take 5 mg by mouth daily.              magnesium chloride (SLOW-MAG) 64 mg TbEC delayed-release tablet Take 2 tablets (128 mg total) by mouth daily.  0     tamsulosin (FLOMAX) 0.4 mg cap Take 0.4 mg by mouth Daily after breakfast.       venlafaxine (EFFEXOR-XR) 150 MG 24 hr capsule Take 1 capsule (150 mg total) by mouth daily. 90 capsule 1     vitamin E 400 unit capsule Take 400 Units by mouth daily.       No current facility-administered medications for this encounter.        Allergies   Allergen Reactions     Penicillins Hives     Social History     Socioeconomic History     Marital status:      Spouse name: Not on file     Number of children: Not on file     Years of education: Not on file     Highest education level: Not on file   Occupational History     Not on file   Social Needs     Financial resource strain: Not on file     Food insecurity:     Worry: Not on file     Inability: Not on file     Transportation needs:     Medical: Not on file     Non-medical: Not on file   Tobacco Use     Smoking status: Never Smoker     Smokeless tobacco: Never Used   Substance and Sexual Activity     Alcohol use: No     Frequency: Never     Drug use: No     Sexual activity: Not on file   Lifestyle     Physical activity:     Days per week: Not on file     Minutes per session: Not on file     Stress: Not on file   Relationships     Social connections:     Talks on phone: Not on file      Gets together: Not on file     Attends [employer] service: Not on file     Active member of club or organization: Not on file     Attends meetings of clubs or organizations: Not on file     Relationship status: Not on file     Intimate partner violence:     Fear of current or ex partner: Not on file     Emotionally abused: Not on file     Physically abused: Not on file     Forced sexual activity: Not on file   Other Topics Concern     Not on file   Social History Narrative     Not on file       The following portions of the patient's history were reviewed and updated as appropriate: allergies, current medications, past family history, past medical history, past social history, past surgical history and problem list.    Review of Systems  A comprehensive review of systems was negative except for: what is noted       Objective:     There were no vitals filed for this visit.Mental Status Examination  Patient is casually dressed, neatly groomed and seated for evaluation. There is no evidence of acute psychological distress. His behavior is socially appropriate, he is cooperative with questioning and eye contact is good. He often uses humor when I ask him a question that he appears to have difficulty understanding her knowing the answer to. He interacts very well with me. He is alert and without obvious signs of distractibility/delirium. He is oriented to person and place. Speech is spontaneous, volume and rate of speech is normal, no halting of speech her speech apraxia noted. Thought processes are with very simple, goal-directed statements that completely lacking depth of content or provide any meaningful information. There is no evidence of auditory or visual hallucinations. No delusional ideation noted. His affect is mostly bright, but with very mild restriction and mood is congruent. Gen. fund of knowledge, insight and memory are all impaired  Functional Testing  Last testing 11/16/16 ACL of 4.4, CPT of 4.7  indicating moderate functional impairment and the need for 24/7 supervision. These scores are similar to that of last year and indicate relative stability.     I asked him to call with any questions or concerns and will see him again in clinic in about 6 months. Total time spent with the patient today was 30 minutes with greater than 50% of the time spent in counseling and care coordination.

## 2021-05-30 NOTE — TELEPHONE ENCOUNTER
Patient/Family is requesting skilled nursing start of care assessment to be completed on 7/16/19. This is outside of the original 48 hour referral. New verbal order needed.     Thank you

## 2021-05-30 NOTE — PROGRESS NOTES
Pt here for ML f/u apt.  Pt had a big fall in February and was in the ICU.  He has been declining rapidly, trouble finding rooms, trouble dressing, incontinence, not enjoying people/activities anymore.  Sleeping has been well he has to be woken up in the AM's.

## 2021-05-31 VITALS — BODY MASS INDEX: 23.77 KG/M2 | WEIGHT: 166 LBS | HEIGHT: 70 IN

## 2021-05-31 VITALS — WEIGHT: 168 LBS | BODY MASS INDEX: 24.11 KG/M2

## 2021-05-31 VITALS — WEIGHT: 156 LBS | BODY MASS INDEX: 22.38 KG/M2

## 2021-06-01 ENCOUNTER — RECORDS - HEALTHEAST (OUTPATIENT)
Dept: ADMINISTRATIVE | Facility: CLINIC | Age: 81
End: 2021-06-01

## 2021-06-01 VITALS — HEIGHT: 70 IN | WEIGHT: 162 LBS | BODY MASS INDEX: 23.19 KG/M2

## 2021-06-01 NOTE — TELEPHONE ENCOUNTER
Aimee from [facility], stating that CMS cant accept face to face attestation signatures from NP's or PA's only MD's.     Aimee contacted DR. Dotson to obtain a signature, however Mauri states that he does not sign for NP's. Even if encounter from Visit 7/9/19 was provided it still needs an MD signature.

## 2021-06-02 VITALS — WEIGHT: 160 LBS | BODY MASS INDEX: 22.96 KG/M2

## 2021-06-02 VITALS — HEIGHT: 67 IN | WEIGHT: 158 LBS | BODY MASS INDEX: 24.8 KG/M2

## 2021-06-03 ENCOUNTER — RECORDS - HEALTHEAST (OUTPATIENT)
Dept: ADMINISTRATIVE | Facility: CLINIC | Age: 81
End: 2021-06-03

## 2021-06-04 VITALS — WEIGHT: 160 LBS | BODY MASS INDEX: 22.9 KG/M2 | HEIGHT: 70 IN

## 2021-06-04 VITALS — WEIGHT: 165 LBS | HEIGHT: 69 IN | BODY MASS INDEX: 24.44 KG/M2

## 2021-06-05 NOTE — PROGRESS NOTES
.  Assessment:     1. Dementia of the Alzheimer type.  2. Depression  3. Anxiety    Plan:     1. Continue with Klonopin  2. Ativan PRN  3. Return to clinic in 6 months    Patient presents to the Memory loss clinic for a follow up visit, he was last seen by me on 19, no medication changes were made.  Wife reports that there has been slight decline in the patient's cognition.  They have also reports that emotionally the patient is doing great.  Long discussion was held with the patient and wife about dementia and treatment plans.  Holidays are extra hard for the patient due to overstimulation.  The patient should maybe have a scheduled afternoon nap.  Patient continues to go to day programming and have a nurse going to his home.  Alzheimer's is a progressive disease, so we will see decline in cognition.  No new behaviors, signs or symptoms of depression or anxiety or sleeping issues.  We will continue with current therapies.  I have suggested that maybe wife attend support meetings to help her with progression of this disease.    Subjective:          He is a 79 y.o. male who initially presented to the memory loss clinic on 11 for evaluation of his cognitive and behavioral and functioning condition.  The patient's wife reports that the patient has demonstrated progressive changes in cognition, function and behavior dating back at least 3 years.  3 years prior the wife realized that the patient may be having trouble cognitively at the patient's father's  he appeared to be confused about family relationships.  His wife found this to be quite striking and began noticing that the patient was also having trouble finding his way while driving to familiar locations and to be having some trouble with forgetting elements of conversation and direction.  At that time the patient does admit that he felt he was having trouble remembering the names of the children of close friends.  By  problems have progressed to  the point where the [facility]  An MRI of the brain revealed mild generalized cortical atrophy as well as a left posterior temporal cortical CVA.  Neuro psychometric testing revealed but would best be described as multiple domain MCI with problems and some prefrontal functions as well as new learning.  Wife was not convinced that the patient's problems had progressed, however, she does agree that she had been assuming more responsibilities in the home and had been adjusting to the patient's perceived deficits.  Over the past year the patient had developed a habit of consuming 2-4 alcoholic beverages per day which was quite unusual for the patient who never consumed alcohol but rarely in the past.  Dr. Dotson's patient at that time was dementia, likely dementia of the Alzheimer's type.  Rule out vascular dementia.  Multiple vascular risk factors.  Rule out depression.  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  Likely multifactorial in etiology, including vascular ischemic changes noted on the MRI.  He also had neuro imaging in September 2011, which did not reveal any significant changes, although was positive for an infarct noted in the posterior lateral left temporal lobe, which was thought to be a small chronic cortical infarct but stable since 2009.  9/28/11 the patient was started on Aricept.     Outside reports reviewed: None.       Patient Active Problem List    Diagnosis Date Noted     Intracranial hemorrhage (H) 02/21/2019     SDH (subdural hematoma) (H) 02/21/2019     Benign non-nodular prostatic hyperplasia with lower urinary tract symptoms 09/08/2016     Anxiety 07/19/2014     Depression 07/19/2014     Dementia of the Alzheimer's type (H) 07/19/2014     Essential  Hypercholesterolemia      Hypertension      Coronary Artery Disease      No past medical history on file.  No past surgical history on file.  No family history on file.  Current Outpatient Medications   Medication Sig Dispense Refill     atorvastatin (LIPITOR) 20 MG tablet Take 20 mg by mouth bedtime.       cholecalciferol, vitamin D3, (VITAMIN D3) 1,000 unit capsule Take 1 capsule (1,000 Units total) by mouth daily.  0     clonazePAM (KLONOPIN) 0.5 MG tablet TAKE 1 TABLET BY MOUTH TWICE DAILY 60 tablet 3     coenzyme Q10 (COQ-10) 100 mg capsule Take 1 capsule (100 mg total) by mouth daily.  0     cyanocobalamin, vitamin B-12, (VITAMIN B-12) 1,000 mcg Subl Place 1 tablet (1,000 mcg total) under the tongue daily. 30 tablet 6     donepezil (ARICEPT) 5 MG tablet        finasteride (PROSCAR) 5 mg tablet Take 5 mg by mouth daily.              LORazepam (ATIVAN) 0.5 MG tablet Take 1 tablet (0.5 mg total) by mouth every 8 (eight) hours as needed for anxiety. 25 tablet 3     magnesium chloride (SLOW-MAG) 64 mg TbEC delayed-release tablet Take 2 tablets (128 mg total) by mouth daily.  0     tamsulosin (FLOMAX) 0.4 mg cap Take 0.4 mg by mouth Daily after breakfast.       venlafaxine (EFFEXOR-XR) 150 MG 24 hr capsule Take 1 capsule (150 mg total) by mouth daily. 90 capsule 1     vitamin E 400 unit capsule Take 400 Units by mouth daily.       No current facility-administered medications for this encounter.        Allergies   Allergen Reactions     Penicillins Hives     Social History     Socioeconomic History     Marital status:      Spouse name: Not on file     Number of children: Not on file     Years of education: Not on file     Highest education level: Not on file   Occupational History     Not on file   Social Needs     Financial resource strain: Not on file     Food insecurity:     Worry: Not on file     Inability: Not on file     Transportation needs:     Medical: Not on file     Non-medical: Not on file    Tobacco Use     Smoking status: Never Smoker     Smokeless tobacco: Never Used   Substance and Sexual Activity     Alcohol use: No     Frequency: Never     Drug use: No     Sexual activity: Not on file   Lifestyle     Physical activity:     Days per week: Not on file     Minutes per session: Not on file     Stress: Not on file   Relationships     Social connections:     Talks on phone: Not on file     Gets together: Not on file     Attends [employer] service: Not on file     Active member of club or organization: Not on file     Attends meetings of clubs or organizations: Not on file     Relationship status: Not on file     Intimate partner violence:     Fear of current or ex partner: Not on file     Emotionally abused: Not on file     Physically abused: Not on file     Forced sexual activity: Not on file   Other Topics Concern     Not on file   Social History Narrative     Not on file       The following portions of the patient's history were reviewed and updated as appropriate: allergies, current medications, past family history, past medical history, past social history, past surgical history and problem list.    Review of Systems  A comprehensive review of systems was negative except for: what is noted       Objective:     Vitals:    01/08/20 1130   BP: 119/76   Pulse: (!) 56   Mental Status Examination  Patient is casually dressed, neatly groomed and seated for evaluation. There is no evidence of acute psychological distress. His behavior is socially appropriate, he is cooperative with questioning and eye contact is good. He often uses humor when I ask him a question that he appears to have difficulty understanding her knowing the answer to. He interacts very well with me. He is alert and without obvious signs of distractibility/delirium. He is oriented to person and place. Speech is spontaneous, volume and rate of speech is normal, no halting of speech her speech apraxia noted. Thought processes are with very  simple, goal-directed statements that completely lacking depth of content or provide any meaningful information. There is no evidence of auditory or visual hallucinations. No delusional ideation noted. His affect is mostly bright, but with very mild restriction and mood is congruent. Gen. fund of knowledge, insight and memory are all impaired  Functional Testing  Last testing 11/16/16 ACL of 4.4, CPT of 4.7 indicating moderate functional impairment and the need for 24/7 supervision. These scores are similar to that of last year and indicate relative stability.     I asked him to call with any questions or concerns and will see him again in clinic in about 6 months. Total time spent with the patient today was 30 minutes with greater than 50% of the time spent in counseling and care coordination. .

## 2021-06-05 NOTE — PROGRESS NOTES
Persons accompanying you (the patient) today: Spouse     How have you been doing since we saw you last? Please note any concerns.  Declining.     Please list any recent hospitalizations/surgeries/procedures you've had since we saw you last:  None     Have you had any falls since your last visit? Yes: Wife was helping patient in the shower and he stood up to quickly and fainted.  He did not hit his head.     Do you have any pain today? No

## 2021-06-07 NOTE — PROGRESS NOTES
"  Scott Diaz is a 79 y.o. male who is being evaluated via a billable video visit in light of the ongoing global health crisis (COVID-19) that requires us to abide by social distancing mandates in order to reduce the risk of COVID-19 exposure.      The patient has been notified of following:     \"This virtual visit will be conducted via a  call between you and your physician/provider. We have found that certain health care needs can be provided without the need for a physical exam.  This service lets us provide the care you need with a short video conversation.  If a prescription is necessary we can send it directly to your pharmacy.  If lab work is needed we can place an order for that and you can then stop by our lab to have the test done at a later time.    If during the course of the call the physician/provider feels a video visit is not appropriate, you will not be charged for this service.\"     Patient has given verbal consent to a telephone visit? Yes    Scott Diaz chief complaint is: in the last month he has become so stiff.  This has been a sudden change.  Wife Sydnie would like to discuss possibility of Sinemet to help with stiffness.  He had one fall yesterday, her attempting to get him up the steps.    Phone call duration: 5 minutes    Thalia Echevarria CMA     There were no vitals filed for this visit.    Outpatient Follow up Memory Loss Evaluation     Pertinent History:  He is a 79 y.o. male who initially presented to the memory loss clinic on 11 for evaluation of his cognitive and behavioral and functioning condition.  The patient's wife reports that the patient has demonstrated progressive changes in cognition, function and behavior dating back at least 3 years.  3 years prior the wife realized that the patient may be having trouble cognitively at the patient's father's  he appeared to be confused about family relationships.  His wife found this to be quite striking and began " noticing that the patient was also having trouble finding his way while driving to familiar locations and to be having some trouble with forgetting elements of conversation and direction.  At that time the patient does admit that he felt he was having trouble remembering the names of the children of close friends.  By 2009 problems have progressed to the point where the [facility]  An MRI of the brain revealed mild generalized cortical atrophy as well as a left posterior temporal cortical CVA.  Neuro psychometric testing revealed but would best be described as multiple domain MCI with problems and some prefrontal functions as well as new learning.  Wife was not convinced that the patient's problems had progressed, however, she does agree that she had been assuming more responsibilities in the home and had been adjusting to the patient's perceived deficits.  Over the past year the patient had developed a habit of consuming 2-4 alcoholic beverages per day which was quite unusual for the patient who never consumed alcohol but rarely in the past. Dr. Dotson's patient at that time was dementia, likely dementia of the Alzheimer's type.  Rule out vascular dementia.  Multiple vascular risk factors.  Rule out depression.  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  Likely multifactorial in etiology, including vascular ischemic changes noted on the MRI.  He also had neuro imaging in September 2011, which did not reveal any significant changes, although was positive for an infarct noted in the posterior lateral left temporal lobe, which was thought to be a small chronic cortical infarct but stable since 2009.  9/28/11 the patient was started on Aricept.      HPI:  Today's appointment is  "a follow up visit for the patient and wife. Patient is in the later stages of Alzheimer's so it is very difficult for him to answer questions. Wife reports that the patient has become very stiff. The wife is having problems with transferring the patient d/t this stiffness. Wife believes that this could be parkinsonisms. She would like to do a trial of Sinemet. The wife reports that her and the patient are very isolated. They no longer take care of their granddaughter, who was a great part of the patient's day. Wife reports that the patient is more confused. She does admit that the patient's routine and structure has been \"screwed up\". I had a long discussion with the wife about Alzheimer's and treatment plan. Structure and routine is very important for Alzheimer's patients, so confusion could be expected in this patient.    We discussed some treatment options and have elected to do a trial of Sinemet 25/100, take 0.5 tab two times a day..      Current Medications: Please see chart. Medications personally reviewed.     Medication Compliance: Yes      Patient Active Problem List    Diagnosis Date Noted     Intracranial hemorrhage (H) 02/21/2019     SDH (subdural hematoma) (H) 02/21/2019     Benign non-nodular prostatic hyperplasia with lower urinary tract symptoms 09/08/2016     Anxiety 07/19/2014     Depression 07/19/2014     Dementia of the Alzheimer's type (H) 07/19/2014     Essential Hypercholesterolemia      Hypertension      Coronary Artery Disease      No past medical history on file.  No past surgical history on file.  No family history on file.  Current Outpatient Medications   Medication Sig Dispense Refill     atorvastatin (LIPITOR) 20 MG tablet Take 20 mg by mouth bedtime.       cholecalciferol, vitamin D3, (VITAMIN D3) 1,000 unit capsule Take 1 capsule (1,000 Units total) by mouth daily.  0     clonazePAM (KLONOPIN) 0.5 MG tablet TAKE 1 TABLET BY MOUTH TWICE DAILY 60 tablet 3     coenzyme Q10 (COQ-10) 100 " mg capsule Take 1 capsule (100 mg total) by mouth daily.  0     cyanocobalamin, vitamin B-12, (VITAMIN B-12) 1,000 mcg Subl Place 1 tablet (1,000 mcg total) under the tongue daily. 30 tablet 6     donepezil (ARICEPT) 5 MG tablet        finasteride (PROSCAR) 5 mg tablet Take 5 mg by mouth daily.              LORazepam (ATIVAN) 0.5 MG tablet TAKE 1 TABLET BY MOUTH EVERY 8 HOURS AS NEEDED FOR ANXIETY 25 tablet 0     magnesium chloride (SLOW-MAG) 64 mg TbEC delayed-release tablet Take 2 tablets (128 mg total) by mouth daily.  0     tamsulosin (FLOMAX) 0.4 mg cap Take 0.4 mg by mouth Daily after breakfast.       venlafaxine (EFFEXOR-XR) 150 MG 24 hr capsule Take 1 capsule (150 mg total) by mouth daily. 90 capsule 1     vitamin E 400 unit capsule Take 400 Units by mouth daily.       carbidopa-levodopa (SINEMET)  mg per tablet Take 0.5 tablets by mouth 2 (two) times a day. 30 tablet 1     No current facility-administered medications for this encounter.        Allergies   Allergen Reactions     Penicillins Hives     Social History     Socioeconomic History     Marital status:      Spouse name: Not on file     Number of children: Not on file     Years of education: Not on file     Highest education level: Not on file   Occupational History     Not on file   Social Needs     Financial resource strain: Not on file     Food insecurity     Worry: Not on file     Inability: Not on file     Transportation needs     Medical: Not on file     Non-medical: Not on file   Tobacco Use     Smoking status: Never Smoker     Smokeless tobacco: Never Used   Substance and Sexual Activity     Alcohol use: No     Frequency: Never     Drug use: No     Sexual activity: Not on file   Lifestyle     Physical activity     Days per week: Not on file     Minutes per session: Not on file     Stress: Not on file   Relationships     Social connections     Talks on phone: Not on file     Gets together: Not on file     Attends [employer] service:  Not on file     Active member of club or organization: Not on file     Attends meetings of clubs or organizations: Not on file     Relationship status: Not on file     Intimate partner violence     Fear of current or ex partner: Not on file     Emotionally abused: Not on file     Physically abused: Not on file     Forced sexual activity: Not on file   Other Topics Concern     Not on file   Social History Narrative     Not on file       The following portions of the patient's history were reviewed and updated as appropriate: allergies, current medications, past family history, past medical history, past social history, past surgical history and problem list.    Review of Systems  A comprehensive review of systems was negative except for:what is noted above    Diagnosis managed and treated at today's visit :  Parkinsonisms   Early onset Alzheimer's disease  Major Neurocognitive disorder  Dementia without behaviors  Confusion  Stiffness  General Anxiety Disorder     Plan:  Medication Adjustment:  Sinemet 25/100 take 0.5 pill two times a day     Other:   Patient will return to clinic in 2 weeks. They agree to call or return sooner with any questions or concerns.  Risks and benefits were discussed.  Continue with his individual therapist.     Continue with the support of the clinic, reassurance, and redirection. Staff monitoring and ongoing assessments per team plan. Current psychotropic medication appears to represent the minimum effective dosage and appears medically necessary. We will continue to monitor and reassess. This team will utilize appropriate emergency services if necessary. I will make myself available if concerns or problems arise.    Total time spent with the patient today was 35 minutes with greater than 50% of the time spent in counseling and care coordination. The patient agrees to call before then with any questions, concerns or problems. We will assess for the appropriateness of possible psychotropic  medication trials/changes. The patient will seek out appropriate emergency services should that become necessary.    Telephone Visit Details    Type of service: Telephone Visit    Phone Start Time: 0920    Phone End Time:  0950    Total time of phone conversation: 3- minutes    Originating Location: Patient's home    Distant Location:  [facility] [place]    Mode of Communication: Telephone call

## 2021-06-08 NOTE — PROGRESS NOTES
"Telephone Visit  Scott Diaz is a 79 y.o. male who is being evaluated via a billable telephone visit in light of the ongoing global health crisis (COVID-19) that requires us to abide by social distancing mandates in order to reduce the risk of COVID-19 exposure.       The patient has been notified of following:     \"This telephone visit will be conducted via a telephone call between you and your physician/provider. We have found that certain health care needs can be provided without the need for a physical exam.  This service lets us provide the care you need with a short phone conversation.  If a prescription is necessary we can send it directly to your pharmacy.  If lab work is needed we can place an order for that and you can then stop by our lab to have the test done at a later time.    If during the course of the phone call the physician/provider feels a telephone visit is not appropriate, you will not be charged for this service.\"     Patient has given verbal consent to a Telephone visit? Yes    Scott Diaz chief complaint is Memory Loss    ALLERGIES  Penicillins      There were no vitals filed for this visit.    Outpatient Follow up Memory Loss Evaluation     Pertinent History:  He is a 79 y.o. male who initially presented to the memory loss clinic on 11 for evaluation of his cognitive and behavioral and functioning condition.  The patient's wife reports that the patient has demonstrated progressive changes in cognition, function and behavior dating back at least 3 years.  3 years prior the wife realized that the patient may be having trouble cognitively at the patient's father's  he appeared to be confused about family relationships.  His wife found this to be quite striking and began noticing that the patient was also having trouble finding his way while driving to familiar locations and to be having some trouble with forgetting elements of conversation and direction.  At that time " the patient does admit that he felt he was having trouble remembering the names of the children of close friends.  By 2009 problems have progressed to the point where the [facility]  An MRI of the brain revealed mild generalized cortical atrophy as well as a left posterior temporal cortical CVA.  Neuro psychometric testing revealed but would best be described as multiple domain MCI with problems and some prefrontal functions as well as new learning.  Wife was not convinced that the patient's problems had progressed, however, she does agree that she had been assuming more responsibilities in the home and had been adjusting to the patient's perceived deficits.  Over the past year the patient had developed a habit of consuming 2-4 alcoholic beverages per day which was quite unusual for the patient who never consumed alcohol but rarely in the past. Dr. Dotson's patient at that time was dementia, likely dementia of the Alzheimer's type.  Rule out vascular dementia.  Multiple vascular risk factors.  Rule out depression.  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  Likely multifactorial in etiology, including vascular ischemic changes noted on the MRI.  He also had neuro imaging in September 2011, which did not reveal any significant changes, although was positive for an infarct noted in the posterior lateral left temporal lobe, which was thought to be a small chronic cortical infarct but stable since 2009.  9/28/11 the patient was started on Aricept.      Is patient on a controlled substance   Yes    checked    Yes   Number of prescribers in last 6 months    1  Urine test preformed today  No   Follow up appointment   Yes      Subjective:          HPI:  The patient is here for a  follow up appointment on a medication change I made on 4/22/2020. I started the patient on Sinemet to see if this would help with the patient's stiffness. Wife reports that Sinemet did not help the patient. A long talk was held with the wife of the patient about Alzheimer's disease and treatment plans. The wife does know how different the patient's routine has been since he has not been allowed to leave the hours. They also are not taking care of their granddaughter which has been a great treatment plan for the patient.     We discussed some treatment options and have elected to monitor symptoms and talk with family to try and come up with different ways family can interact with the patient.      Current Medications: Please see chart. Medications personally reviewed.     Medication Compliance: Yes    Patient Active Problem List    Diagnosis Date Noted     Intracranial hemorrhage (H) 02/21/2019     SDH (subdural hematoma) (H) 02/21/2019     Benign non-nodular prostatic hyperplasia with lower urinary tract symptoms 09/08/2016     Anxiety 07/19/2014     Depression 07/19/2014     Dementia of the Alzheimer's type (H) 07/19/2014     Essential Hypercholesterolemia      Hypertension      Coronary Artery Disease      No past medical history on file.  No past surgical history on file.  No family history on file.  Current Outpatient Medications   Medication Sig Dispense Refill     atorvastatin (LIPITOR) 20 MG tablet Take 20 mg by mouth bedtime.       carbidopa-levodopa (SINEMET)  mg per tablet Take 0.5 tablets by mouth 2 (two) times a day. 30 tablet 1     cholecalciferol, vitamin D3, (VITAMIN D3) 1,000 unit capsule Take 1 capsule (1,000 Units total) by mouth daily.  0     clonazePAM (KLONOPIN) 0.5 MG tablet Take 1 tablet by mouth twice daily 60 tablet 0     coenzyme Q10 (COQ-10) 100 mg capsule Take 1 capsule (100 mg total) by mouth daily.  0     cyanocobalamin, vitamin B-12, (VITAMIN B-12) 1,000 mcg Subl Place 1 tablet  (1,000 mcg total) under the tongue daily. 30 tablet 6     donepezil (ARICEPT) 5 MG tablet        finasteride (PROSCAR) 5 mg tablet Take 5 mg by mouth daily.              LORazepam (ATIVAN) 0.5 MG tablet TAKE 1 TABLET BY MOUTH EVERY 8 HOURS AS NEEDED FOR ANXIETY 25 tablet 0     magnesium chloride (SLOW-MAG) 64 mg TbEC delayed-release tablet Take 2 tablets (128 mg total) by mouth daily.  0     tamsulosin (FLOMAX) 0.4 mg cap Take 0.4 mg by mouth Daily after breakfast.       venlafaxine (EFFEXOR-XR) 150 MG 24 hr capsule Take 1 capsule (150 mg total) by mouth daily. 90 capsule 1     vitamin E 400 unit capsule Take 400 Units by mouth daily.       No current facility-administered medications for this encounter.        Allergies   Allergen Reactions     Penicillins Hives     Social History     Socioeconomic History     Marital status:      Spouse name: Not on file     Number of children: Not on file     Years of education: Not on file     Highest education level: Not on file   Occupational History     Not on file   Social Needs     Financial resource strain: Not on file     Food insecurity     Worry: Not on file     Inability: Not on file     Transportation needs     Medical: Not on file     Non-medical: Not on file   Tobacco Use     Smoking status: Never Smoker     Smokeless tobacco: Never Used   Substance and Sexual Activity     Alcohol use: No     Frequency: Never     Drug use: No     Sexual activity: Not on file   Lifestyle     Physical activity     Days per week: Not on file     Minutes per session: Not on file     Stress: Not on file   Relationships     Social connections     Talks on phone: Not on file     Gets together: Not on file     Attends [employer] witness service: Not on file     Active member of club or organization: Not on file     Attends meetings of clubs or organizations: Not on file     Relationship status: Not on file     Intimate partner violence     Fear of current or ex partner: Not on file      Emotionally abused: Not on file     Physically abused: Not on file     Forced sexual activity: Not on file   Other Topics Concern     Not on file   Social History Narrative     Not on file       Review of Systems  A comprehensive review of systems was negative except for:what is noted above    Mental Status Examination  Appearance: .telephone visit, I did not visualize patient  Behavior:   . Wife reports no signs of agitation or restlessness  Speech: .did not participate in the conversation  Mood/Affect:   wife.denies any depression or anxiety, flat, no evidence of any shea or agitation  Thought Content: . Wife denies any psychosis at this time   Suicidal or Homicidal Thoughts:   .no evidence of any sucidality or homicidal ideations  Thought Process/Formulation:  .vague, able to track conversation  Associations: .impaired  Fund of Knowledge:  .impaired   Attention/Concentration: .impaired  Insight: .impaired, wife denies any recent change  Judgement:  impaired, wife denies any recent change.  Memory:   . impaired  Motor Status:  wife denies any recent change   Orientation: .oriented to person    Functional Testing  Last testing 11/16/16 ACL of 4.4, CPT of 4.7 indicating moderate functional impairment and the need for 24/7 supervision. These scores are similar to that of last year and indicate relative stability.     Diagnosis managed and treated at today's visit   Dementia of the Alzheimer's type  Depression  Anxiety   Major cognitive disorder     Plan:  Medication Adjustment:  No medication changes    Other:   Patient will return to clinic in two weeks. They agree to call or return sooner with any questions or concerns.  Risks and benefits were discussed.  Continue with his individual therapist.     Continue with the support of the clinic, reassurance, and redirection. Staff monitoring and ongoing assessments per team plan. Current psychotropic medication appears to represent the minimum effective dosage and  appears medically necessary. We will continue to monitor and reassess. This team will utilize appropriate emergency services if necessary. I will make myself available if concerns or problems arise.    Video Visit Details    Type of service: Video Visit    Video Start Time: 1350    Video End Time:  1422    Total time for Video call: 35 minutes    Originating Location: Patient's home    Distant Location:  [facility] [place]    Mode of Communication: Telephone call

## 2021-06-09 NOTE — PROGRESS NOTES
"Telephone Visit  Scott Diaz is a 79 y.o. male who is being evaluated via a billable telephone visit in light of the ongoing global health crisis (COVID-19) that requires us to abide by social distancing mandates in order to reduce the risk of COVID-19 exposure.       The patient has been notified of following:     \"This telephone visit will be conducted via a telephone call between you and your physician/provider. We have found that certain health care needs can be provided without the need for a physical exam.  This service lets us provide the care you need with a short phone conversation.  If a prescription is necessary we can send it directly to your pharmacy.  If lab work is needed we can place an order for that and you can then stop by our lab to have the test done at a later time.    If during the course of the phone call the physician/provider feels a telephone visit is not appropriate, you will not be charged for this service.\"     Patient has given verbal consent to a Telephone visit? Yes    Scott Diaz chief complaint is Memory Loss     ALLERGIES  Penicillins     Any new medication (other provider):   No   Meds started at last appointment  No   Meds increased at last appointment    No      Any concerns you would like to be addressed at this appointment?  Sydnie, wife, states that pt is deteriorating.                                                        Phone Start Time: 11:20am    Phone End Time:  11:30am     Total time of phone conversation: 10 minutes    Phone number the patient would like to use:  [phone]    Thalia Echevarria [facility]     There were no vitals filed for this visit.    Outpatient Follow up Parkinson's Dx Evaluation     Pertinent History:   He is a 79 y.o. male who initially presented to the memory loss clinic on 9/6/11 for evaluation of his cognitive and behavioral and functioning condition.  The patient's wife reports that the patient has demonstrated progressive changes in " cognition, function and behavior dating back at least 3 years.  3 years prior the wife realized that the patient may be having trouble cognitively at the patient's father's  he appeared to be confused about family relationships.  His wife found this to be quite striking and began noticing that the patient was also having trouble finding his way while driving to familiar locations and to be having some trouble with forgetting elements of conversation and direction.  At that time the patient does admit that he felt he was having trouble remembering the names of the children of close friends.  By  problems have progressed to the point where the patient [facility]  An MRI of the brain revealed mild generalized cortical atrophy as well as a left posterior temporal cortical CVA.  Neuro psychometric testing revealed but would best be described as multiple domain MCI with problems and some prefrontal functions as well as new learning.  Wife was not convinced that the patient's problems had progressed, however, she does agree that she had been assuming more responsibilities in the home and had been adjusting to the patient's perceived deficits.  Over the past year the patient had developed a habit of consuming 2-4 alcoholic beverages per day which was quite unusual for the patient who never consumed alcohol but rarely in the past. Dr. Dotson's patient at that time was dementia, likely dementia of the Alzheimer's type.  Rule out vascular dementia.  Multiple vascular risk factors.  Rule out depression.  Neuropsychological assessment was repeated on 11 and diagnostic impression was amnestic mild cognitive impairment, rule out Alzheimer's disease mild in nature, provisional diagnosis of adjustment disorder, not otherwise specified.  Likely multifactorial in etiology, including vascular ischemic changes noted on the MRI.  He also had neuro imaging in 2011,  which did not reveal any significant changes, although was positive for an infarct noted in the posterior lateral left temporal lobe, which was thought to be a small chronic cortical infarct but stable since 2009.  9/28/11 the patient was started on Aricept.      Is patient on a controlled substance   Yes    checked    Yes   Number of prescribers in last 6 months    1  Urine test preformed today  No   Follow up appointment   Yes      Subjective:          HPI:  The patient is here for a follow up appointment on a medication change I made on 5/7/2020, where no medication changes were made.  Wife reports that the patient continues to mentally decline.  The patient is having a very hard following commands.  He is also very afraid to go anywhere.  The wife has a hard time getting the patient in and out of the car.  Patient has a hard time with attention and focus and staying on task.  A long discussion was held with the patient and his wife about Alzheimer's and treatment plans.  Given the lockdown's and the abrupt change in the patient's routine, this also most likely is causing more memory issues.    We discussed some treatment options and have elected to start the patient on Ritalin 5 mg twice daily.      Current Medications: Please see chart. Medications personally reviewed.     Medication Compliance: Yes    Patient Active Problem List    Diagnosis Date Noted     Intracranial hemorrhage (H) 02/21/2019     SDH (subdural hematoma) (H) 02/21/2019     Benign non-nodular prostatic hyperplasia with lower urinary tract symptoms 09/08/2016     Anxiety 07/19/2014     Depression 07/19/2014     Dementia of the Alzheimer's type (H) 07/19/2014     Essential Hypercholesterolemia      Hypertension      Coronary Artery Disease      No past medical history on file.  No past surgical history on file.  No family history on file.  Current Outpatient Medications   Medication Sig Dispense Refill     atorvastatin (LIPITOR) 20 MG tablet  Take 20 mg by mouth bedtime.       carbidopa-levodopa (SINEMET)  mg per tablet Take 0.5 tablets by mouth 2 (two) times a day. 30 tablet 1     cholecalciferol, vitamin D3, (VITAMIN D3) 1,000 unit capsule Take 1 capsule (1,000 Units total) by mouth daily.  0     clonazePAM (KLONOPIN) 0.5 MG tablet Take 1 tablet by mouth twice daily 60 tablet 0     coenzyme Q10 (COQ-10) 100 mg capsule Take 1 capsule (100 mg total) by mouth daily.  0     cyanocobalamin, vitamin B-12, (VITAMIN B-12) 1,000 mcg Subl Place 1 tablet (1,000 mcg total) under the tongue daily. 30 tablet 6     donepezil (ARICEPT) 5 MG tablet        finasteride (PROSCAR) 5 mg tablet Take 5 mg by mouth daily.              LORazepam (ATIVAN) 0.5 MG tablet TAKE 1 TABLET BY MOUTH EVERY 8 HOURS AS NEEDED FOR ANXIETY 25 tablet 0     magnesium chloride (SLOW-MAG) 64 mg TbEC delayed-release tablet Take 2 tablets (128 mg total) by mouth daily.  0     tamsulosin (FLOMAX) 0.4 mg cap Take 0.4 mg by mouth Daily after breakfast.       venlafaxine (EFFEXOR-XR) 150 MG 24 hr capsule Take 1 capsule (150 mg total) by mouth daily. 90 capsule 1     vitamin E 400 unit capsule Take 400 Units by mouth daily.       No current facility-administered medications for this encounter.        Allergies   Allergen Reactions     Penicillins Hives     Social History     Socioeconomic History     Marital status:      Spouse name: Not on file     Number of children: Not on file     Years of education: Not on file     Highest education level: Not on file   Occupational History     Not on file   Social Needs     Financial resource strain: Not on file     Food insecurity     Worry: Not on file     Inability: Not on file     Transportation needs     Medical: Not on file     Non-medical: Not on file   Tobacco Use     Smoking status: Never Smoker     Smokeless tobacco: Never Used   Substance and Sexual Activity     Alcohol use: No     Frequency: Never     Drug use: No     Sexual activity: Not  on file   Lifestyle     Physical activity     Days per week: Not on file     Minutes per session: Not on file     Stress: Not on file   Relationships     Social connections     Talks on phone: Not on file     Gets together: Not on file     Attends [employer] service: Not on file     Active member of club or organization: Not on file     Attends meetings of clubs or organizations: Not on file     Relationship status: Not on file     Intimate partner violence     Fear of current or ex partner: Not on file     Emotionally abused: Not on file     Physically abused: Not on file     Forced sexual activity: Not on file   Other Topics Concern     Not on file   Social History Narrative     Not on file       Review of Systems  A comprehensive review of systems was negative except for:what is noted above    Mental Status Exam:   Appearance: .telephone visit, I did not visualize patient  Behavior:   . Wife reports no signs of agitation or restlessness  Speech: .did not participate in the conversation  Mood/Affect:   wife.denies any depression or anxiety, flat, no evidence of any shea or agitation  Thought Content: . Wife denies any psychosis at this time   Suicidal or Homicidal Thoughts:   .no evidence of any sucidality or homicidal ideations  Thought Process/Formulation:  .vague, able to track conversation  Associations: .impaired  Fund of Knowledge:  .impaired   Attention/Concentration: .impaired  Insight: .impaired, wife denies any recent change  Judgement:  impaired, wife denies any recent change.  Memory:   . impaired  Motor Status:  wife denies any recent change   Orientation: .oriented to person     Functional Testing  Last testing 11/16/16 ACL of 4.4, CPT of 4.7 indicating moderate functional impairment and the need for 24/7 supervision. These scores are similar to that of last year and indicate relative stability.      Diagnosis managed and treated at today's visit   Dementia of the Alzheimer's  type  Depression  Anxiety   Major cognitive disorder  Attention and Concentration deficit     Plan:  Medication Adjustment:  Start Ritalin 5 mg BID    Other:   Patient will return to clinic in 4 weeks. They agree to call or return sooner with any questions or concerns.  Risks and benefits were discussed.  Continue with his individual therapist.     Continue with the support of the clinic, reassurance, and redirection. Staff monitoring and ongoing assessments per team plan. Current psychotropic medication appears to represent the minimum effective dosage and appears medically necessary. We will continue to monitor and reassess. This team will utilize appropriate emergency services if necessary. I will make myself available if concerns or problems arise.    Video Visit Details    Type of service: Video Visit    Video Start Time: 1140    Video End Time:  1205    Total time for Video call: 25 minutes    Originating Location: Patient's home    Distant Location:  [facility]    Mode of Communication: Telephone call    General Information:  Today you had your appointment with Shelbi Mendes CNP     If lab work was done today as part of your evaluation you will generally be contacted via My Chart, mail, or phone with the results within 1-5 days. If there is an alarming result we will contact you by phone. Lab results come back at varying times, I generally wait until all labs are resulted before making comments on results. Please note labs are automatically released to My Chart once available.     If you need refills please contact your pharmacist. They will send a refill request to me to review. Please allow 3 business days for us to process all refill requests.     Please call or send a medical message through My Chart, with any questions or concerns    If you need any paperwork completed please fax forms to [phone]. Please state if you would like a copy of the completed  paperwork, mailed or faxed back to the patient and a fax number to fax the paperwork to. Please allow up to 10 days for paperwork to be completed.    Shelbi Mendes, CNP

## 2021-06-11 NOTE — PROGRESS NOTES
[facility] Follow-up Note    Assessment & Plan        1. Coronary atherosclerosis due to lipid rich plaque - per angiography in 2006 the left main was normal, proximal left anterior descending had a 10-20% lesion. Circumflex was normal and the first obtuse marginal artery had a proximal 95% lesion which received a drug-coated stent. The ramus had a proximal 80% lesion which is too small to intervene upon and the right coronary artery had a mid 10-20% lesion.  He is having vague pinging symptoms and will schedule for nuclear stress test as well as continue to work on prevention.   a stress test shows significant abnormalities will arrange for invasive angiography.     2. Essential Hypercholesterolemia -cholesterol 132 with an LDL of 79 from November 2016 which is acceptable.     3. Essential hypertension with goal blood pressure less than 130/85 -under good control currently.     4. Early onset Alzheimer's dementia without behavioral disturbance -on Namenda, Aricept, lorazepam as well as Effexor and defer to mental health provider.     5.  Bradycardia-asymptomatic and most likely secondary to Aricept and no changes just yet.  6.  Pinging-rule out ischemia and if persists consider 14 day ACT monitor.    Plan  1.  Nuclear stress test and a significantly abnormal angiography.  2.  If pending persistent chest arrange for 14 day ACT monitor.  3.  Follow-up with me in 1 year or sooner if needed.    Subjective  CC: 76 old white gentleman here for yearly follow-up today.  Lately he is complaining of a pinging type sensation in his chest.  Feels of the heart is racing, one particular occasion woke him up from sleep and cause him to be screaming.  His wife tells me the lorazepam helps.  She tells me that his dexterity is now worse than it has been in the past and he has a difficult time typing.  There is no other significant chest discomfort, palpitations, shortness of breath, PND, orthopnea or peripheral  "edema.    Medications  Current Outpatient Prescriptions   Medication Sig     aspirin 81 MG tablet Take 81 mg by mouth daily.     atorvastatin (LIPITOR) 20 MG tablet Take 20 mg by mouth bedtime.     donepezil (ARICEPT) 10 MG tablet TAKE 1 TABLET BY MOUTH EVERY MORNING     finasteride (PROSCAR) 5 mg tablet Take 5 mg by mouth at bedtime.     LORazepam (ATIVAN) 0.5 MG tablet Take 1 tablet (0.5 mg total) by mouth 2 (two) times a day as needed for anxiety (1/2 - 1 tab).     memantine (NAMENDA) 10 MG tablet TAKE 1 TABLET (10 MG TOTAL) BY MOUTH 2 (TWO) TIMES A DAY. (Patient taking differently: TAKE 1 TABLET (10 MG TOTAL) BY MOUTH 2 (TWO) TIMES A DAY. 23mg)     venlafaxine (EFFEXOR-XR) 150 MG 24 hr capsule TAKE ONE CAPSULE EVERY DAY       Objective  /66 (Patient Site: Right Arm, Patient Position: Sitting, Cuff Size: Adult Regular)  Pulse (!) 56  Resp 12  Ht 5' 10\" (1.778 m)  Wt 166 lb (75.3 kg)  BMI 23.82 kg/m2    General Appearance:    Alert, cooperative, no distress, appears stated age   Head:    Normocephalic, without obvious abnormality, atraumatic   Throat:   Lips, mucosa, and tongue normal; teeth and gums normal   Neck:   Supple, symmetrical, trachea midline, no adenopathy;        thyroid:  No enlargement/tenderness/nodules; no carotid    bruit or JVD   Back:     Symmetric, no curvature, ROM normal, no CVA tenderness   Lungs:     Clear to auscultation bilaterally, respirations unlabored   Chest wall:    No tenderness or deformity   Heart:    Regular rate and rhythm, S1 and S2 normal, no murmur, rub   or gallop   Abdomen:     Soft, non-tender, bowel sounds active all four quadrants,     no masses, no organomegaly   Extremities:   Normal, atraumatic, no cyanosis or edema   Pulses:   2+ and symmetric all extremities   Skin:   Skin color, texture, turgor normal, no rashes or lesions     Results    Lab Results personally reviewed   Lab Results   Component Value Date    CHOL 132 11/03/2016    CHOL 141 09/22/2015 "     Lab Results   Component Value Date    HDL 36 (L) 11/03/2016    HDL 50 09/22/2015     Lab Results   Component Value Date    LDLCALC 79 11/03/2016    LDLCALC 71 09/22/2015     Lab Results   Component Value Date    TRIG 85 11/03/2016    TRIG 98 09/22/2015     Lab Results   Component Value Date    WBC 6.9 07/31/2012    HGB 14.7 07/31/2012    HCT 43.6 07/31/2012     07/31/2012     Lab Results   Component Value Date    CREATININE 1.01 06/22/2017    BUN 17 06/22/2017     06/22/2017    K 4.9 06/22/2017    CO2 32 (H) 06/22/2017     Review of Systems:   General: WNL  Eyes: WNL  Ears/Nose/Throat: WNL  Lungs: WNL  Heart: Chest Pain  Stomach: WNL  Bladder: Frequent Urination at Night  Muscle/Joints: WNL  Skin: WNL  Nervous System: Daytime Sleepiness  Mental Health: Confusion     Blood: WNL

## 2021-06-12 NOTE — TELEPHONE ENCOUNTER
Sydnie Diaz called in regards to her , Scott. She said she'd like to speak to Shelbi about a medication change or adjustment for Scott as he's not been sleeping and neither is she as a result.    Migel Funez MA

## 2021-06-12 NOTE — PROGRESS NOTES
"Telephone Visit  Scott Diaz is a 80 y.o. male who is being evaluated via a billable telephone visit in light of the ongoing global health crisis (COVID-19) that requires us to abide by social distancing mandates in order to reduce the risk of COVID-19 exposure.       The patient has been notified of following:     \"This telephone visit will be conducted via a call between you and your physician/provider. We have found that certain health care needs can be provided without the need for a physical exam.  This service lets us provide the care you need with a short phone conversation.  If a prescription is necessary we can send it directly to your pharmacy.  If lab work is needed we can place an order for that and you can then stop by our lab to have the test done at a later time.    If during the course of the call the physician/provider feels a telephone visit is not appropriate, you will not be charged for this service.\"     Patient has given verbal consent to a Telephone visit? Yes    Consent was obtained for this service by one of our care team members    Scott Diaz chief complaint is post concussion syndrome    ALLERGIES  Penicillins    Orders from previous visit:   Neuropsychological assessment completed    No   Currently doing PT  No   Completed No   Currently doing OT  No   Completed No    Currently doing ST   No   Completed No   Psychology  No    Done where:     Any new medication (other provider):   No   Currently on medication to help with sleep    Yes    Clonazepam (but it's not working)    Currently on any mental health medications     Yes  Effexor       Currently on medication for attention, ADD/ADHD    No       Any concerns you would like to be addressed at this appointment?  Sleep issues                                                      Phone Start Time: 1:45pm    Phone End Time:  1:52pm    Total time of phone conversation: 7 mins    Phone number patient would like to use: [phone] "     Migel Funez CMA     Is patient on a controlled substance   Yes    checked    Yes   Number of prescribers in last 6 months    1  Urine test preformed today  No   Follow up appointment   Yes        Outpatient Follow up Memory Loss  Evaluation    Pertinent History:   He is a 79 y.o. male who initially presented to the memory loss clinic on 11 for evaluation of his cognitive and behavioral and functioning condition.  The patient's wife reports that the patient has demonstrated progressive changes in cognition, function and behavior dating back at least 3 years.  3 years prior the wife realized that the patient may be having trouble cognitively at the patient's father's  he appeared to be confused about family relationships.  His wife found this to be quite striking and began noticing that the patient was also having trouble finding his way while driving to familiar locations and to be having some trouble with forgetting elements of conversation and direction.  At that time the patient does admit that he felt he was having trouble remembering the names of the children of close friends.  By  problems have progressed to the point where the patient [facility]  An MRI of the brain revealed mild generalized cortical atrophy as well as a left posterior temporal cortical CVA.  Neuro psychometric testing revealed but would best be described as multiple domain MCI with problems and some prefrontal functions as well as new learning.  Wife was not convinced that the patient's problems had progressed, however, she does agree that she had been assuming more responsibilities in the home and had been adjusting to the patient's perceived deficits.  Over the past year the patient had developed a habit of consuming 2-4 alcoholic beverages per day which was quite unusual for the patient who never consumed alcohol but rarely in the past. Dr. oDtson's patient at that time  was dementia, likely dementia of the Alzheimer's type.  Rule out vascular dementia.  Multiple vascular risk factors.  Rule out depression.  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  Likely multifactorial in etiology, including vascular ischemic changes noted on the MRI.  He also had neuro imaging in September 2011, which did not reveal any significant changes, although was positive for an infarct noted in the posterior lateral left temporal lobe, which was thought to be a small chronic cortical infarct but stable since 2009.  9/28/11 the patient was started on Aricept.      Is patient on a controlled substance   Yes    checked    Yes   Number of prescribers in last 6 months    1  Urine test preformed today  No   Follow up appointment   Yes      Subjective:          HPI    The patient returns to the concussion clinic for a follow up visit, He was last seen by me on 7/15/2020, where the patient was started on Ritalin.  Wife reports that she is not getting any sleep.  The patient continues to get up multiple times during the night and will wander around the house.  The wife also reports that he is walking bad and is gait is very unstable.  Wife reports that there has been quite a decline in the patient's memory and ability to do ADLs.  Patient now needs full support of his wife.  Wife no longer live patient by himself.    We discussed some treatment options and have elected to give the wife alarms for chairs and beds.  I also start the patient on Seroquel 12 to help the patient sleep..    Current Medications: Please see chart. Medications personally reviewed    Medication Compliance: Yes      Patient Active Problem List    Diagnosis Date Noted     Intracranial hemorrhage (H) 02/21/2019     SDH (subdural hematoma) (H)  02/21/2019     Benign non-nodular prostatic hyperplasia with lower urinary tract symptoms 09/08/2016     Anxiety 07/19/2014     Depression 07/19/2014     Dementia of the Alzheimer's type (H) 07/19/2014     Essential Hypercholesterolemia      Hypertension      Coronary Artery Disease      History reviewed. No pertinent past medical history.  History reviewed. No pertinent surgical history.  History reviewed. No pertinent family history.  Current Outpatient Medications   Medication Sig Dispense Refill     atorvastatin (LIPITOR) 20 MG tablet Take 20 mg by mouth bedtime.       cholecalciferol, vitamin D3, (VITAMIN D3) 1,000 unit capsule Take 1 capsule (1,000 Units total) by mouth daily.  0     clonazePAM (KLONOPIN) 0.5 MG tablet Take 1 tablet by mouth twice daily 60 tablet 0     coenzyme Q10 (COQ-10) 100 mg capsule Take 1 capsule (100 mg total) by mouth daily.  0     cyanocobalamin, vitamin B-12, (VITAMIN B-12) 1,000 mcg Subl Place 1 tablet (1,000 mcg total) under the tongue daily. 30 tablet 6     LORazepam (ATIVAN) 0.5 MG tablet TAKE 1 TABLET BY MOUTH EVERY 8 HOURS AS NEEDED FOR ANXIETY 25 tablet 0     magnesium chloride (SLOW-MAG) 64 mg TbEC delayed-release tablet Take 2 tablets (128 mg total) by mouth daily.  0     venlafaxine (EFFEXOR-XR) 150 MG 24 hr capsule Take 1 capsule (150 mg total) by mouth daily. 90 capsule 1     vitamin E 400 unit capsule Take 400 Units by mouth daily.       donepezil (ARICEPT) 5 MG tablet        finasteride (PROSCAR) 5 mg tablet Take 5 mg by mouth daily.              methylphenidate HCl (RITALIN) 5 MG tablet Take 1 tablet (5 mg total) by mouth 2 (two) times a day. 60 tablet 0     tamsulosin (FLOMAX) 0.4 mg cap Take 0.4 mg by mouth Daily after breakfast.       No current facility-administered medications for this encounter.      Social History     Socioeconomic History     Marital status:      Spouse name: Not on file     Number of children: Not on file     Years of education: Not on  file     Highest education level: Not on file   Occupational History     Not on file   Social Needs     Financial resource strain: Not on file     Food insecurity     Worry: Not on file     Inability: Not on file     Transportation needs     Medical: Not on file     Non-medical: Not on file   Tobacco Use     Smoking status: Never Smoker     Smokeless tobacco: Never Used   Substance and Sexual Activity     Alcohol use: No     Frequency: Never     Drug use: No     Sexual activity: Not on file   Lifestyle     Physical activity     Days per week: Not on file     Minutes per session: Not on file     Stress: Not on file   Relationships     Social connections     Talks on phone: Not on file     Gets together: Not on file     Attends Congregational service: Not on file     Active member of club or organization: Not on file     Attends meetings of clubs or organizations: Not on file     Relationship status: Not on file     Intimate partner violence     Fear of current or ex partner: Not on file     Emotionally abused: Not on file     Physically abused: Not on file     Forced sexual activity: Not on file   Other Topics Concern     Not on file   Social History Narrative     Not on file       The following portions of the patient's history were reviewed and updated as appropriate: allergies, current medications, past family history, past medical history, past social history, past surgical history and problem list.    Review of Systems  A comprehensive review of systems was negative except for: What is noted above    Mental Status Exam:   Appearance: .telephone visit, I did not visualize patient  Behavior:   . Wife reports no signs of agitation or restlessness  Speech: .did not participate in the conversation  Mood/Affect:   wife.denies any depression or anxiety, flat, no evidence of any shea or agitation  Thought Content: . Wife denies any psychosis at this time   Suicidal or Homicidal Thoughts:   .no evidence of any sucidality or  homicidal ideations  Thought Process/Formulation:  .vague, able to track conversation  Associations: .impaired  Fund of Knowledge:  .impaired   Attention/Concentration: .impaired  Insight: .impaired, wife denies any recent change  Judgement:  impaired, wife denies any recent change.  Memory:   . impaired  Motor Status:  wife denies any recent change   Orientation: .oriented to person     Functional Testing  Last testing 11/16/16 ACL of 4.4, CPT of 4.7 indicating moderate functional impairment and the need for 24/7 supervision. These scores are similar to that of last year and indicate relative stability.      Diagnosis managed and treated at today's visit   Dementia of the Alzheimer's type  Depression  Anxiety   Major cognitive disorder  Attention and Concentration deficit  Confusion  Gait instability  Risk for falls     Plan:  Medication Adjustment:  Seroquel     Other:   Patient will return to clinic in 4 weeks. They agree to call or return sooner with any questions or concerns.  Risks and benefits were discussed.  Continue with his individual therapist.     Continue with the support of the clinic, reassurance, and redirection. Staff monitoring and ongoing assessments per team plan. Current psychotropic medication appears to represent the minimum effective dosage and appears medically necessary. We will continue to monitor and reassess. This team will utilize appropriate emergency services if necessary. I will make myself available if concerns or problems arise.     Telephone Visit Details     Type of service: Telephone Visit     Phone Start Time: 1410`      Phone End Time:  1435     Total time for Telephone call: 25 minutes     Originating Location: Patient's home     Distant Location:  [facility] [place]     Mode of Communication: Telephone call     General Information:  Today you had your appointment with Shelbi Mendes CNP     If lab work was done today as part of your evaluation  you will generally be contacted via My Chart, mail, or phone with the results within 1-5 days. If there is an alarming result we will contact you by phone. Lab results come back at varying times, I generally wait until all labs are resulted before making comments on results. Please note labs are automatically released to My Chart once available.     If you need refills please contact your pharmacist. They will send a refill request to me to review. Please allow 3 business days for us to process all refill requests.     Please call or send a medical message through My Chart, with any questions or concerns     If you need any paperwork completed please fax forms to [phone]. Please state if you would like a copy of the completed paperwork, mailed or faxed back to the patient and a fax number to fax the paperwork to. Please allow up to 10 days for paperwork to be completed.     Shelbi Mendes, CNP

## 2021-06-13 NOTE — PROGRESS NOTES
"Telephone Visit  Scott Diaz is a 80 y.o. male who is being evaluated via a billable telephone visit in light of the ongoing global health crisis (COVID-19) that requires us to abide by social distancing mandates in order to reduce the risk of COVID-19 exposure.       The patient has been notified of following:     \"This telephone visit will be conducted via a phone call between you and your physician/provider. We have found that certain health care needs can be provided without the need for a physical exam.  This service lets us provide the care you need with a short phone conversation.  If a prescription is necessary we can send it directly to your pharmacy.  If lab work is needed we can place an order for that and you can then stop by our lab to have the test done at a later time.    If during the course of the call the physician/provider feels a telephone visit is not appropriate, you will not be charged for this service.\"     Patient has given verbal consent to a Telephone visit? 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 No                                                       Phone Start Time: 2:01pm    Phone End Time:  2:07pm    Total time of phone conversation: 6 minutes    Phone number patient would like to use Telephone call, [phone]     Migel Funez CMA     Vitals:    11/10/20 1404   Weight: 160 lb (72.6 kg)   Height: 5' 10\" (1.778 m)       Outpatient Follow up TBI Evaluation      Pertinent History:   He is a " 79 y.o. male who initially presented to the memory loss clinic on 11 for evaluation of his cognitive and behavioral and functioning condition.  The patient's wife reports that the patient has demonstrated progressive changes in cognition, function and behavior dating back at least 3 years.  3 years prior the wife realized that the patient may be having trouble cognitively at the patient's father's  he appeared to be confused about family relationships.  His wife found this to be quite striking and began noticing that the patient was also having trouble finding his way while driving to familiar locations and to be having some trouble with forgetting elements of conversation and direction.  At that time the patient does admit that he felt he was having trouble remembering the names of the children of close friends.  By  problems have progressed to the point where the patient [facility]  An MRI of the brain revealed mild generalized cortical atrophy as well as a left posterior temporal cortical CVA.  Neuro psychometric testing revealed but would best be described as multiple domain MCI with problems and some prefrontal functions as well as new learning.  Wife was not convinced that the patient's problems had progressed, however, she does agree that she had been assuming more responsibilities in the home and had been adjusting to the patient's perceived deficits.  Over the past year the patient had developed a habit of consuming 2-4 alcoholic beverages per day which was quite unusual for the patient who never consumed alcohol but rarely in the past. Dr. Dotson's patient at that time was dementia, likely dementia of the Alzheimer's type.  Rule out vascular dementia.  Multiple vascular risk factors.  Rule out depression.  Neuropsychological assessment was repeated on 11 and diagnostic impression was amnestic mild cognitive impairment, rule out Alzheimer's  disease mild in nature, provisional diagnosis of adjustment disorder, not otherwise specified.  Likely multifactorial in etiology, including vascular ischemic changes noted on the MRI.  He also had neuro imaging in September 2011, which did not reveal any significant changes, although was positive for an infarct noted in the posterior lateral left temporal lobe, which was thought to be a small chronic cortical infarct but stable since 2009.  9/28/11 the patient was started on Aricept.      Is patient on a controlled substance   Yes    checked    Yes   Number of prescribers in last 6 months    1  Urine test preformed today  No   Follow up appointment   Yes        HPI:  Patient presents today for the purposes of medication management.  The patient was last seen by me on 10/14/2020, at this appointment I started the patient on Seroquel 25 mg at night.  The patient no longer has problems with getting up in the middle the night and walking around.  Wife reports that patient is now sleeping throughout the night.  Patient continues to decline cognitively, he is very afraid to get into cars and leave his house, but wife is adjusting.  Wife denies any problems or concerns     We discussed some treatment options and have elected to continue with current therapies.      Current Medications: Please see chart. Medications personally reviewed.    Patient Active Problem List    Diagnosis Date Noted     Intracranial hemorrhage (H) 02/21/2019     SDH (subdural hematoma) (H) 02/21/2019     Benign non-nodular prostatic hyperplasia with lower urinary tract symptoms 09/08/2016     Anxiety 07/19/2014     Depression 07/19/2014     Dementia of the Alzheimer's type (H) 07/19/2014     Essential Hypercholesterolemia      Hypertension      Coronary Artery Disease      History reviewed. No pertinent past medical history.  History reviewed. No pertinent surgical history.  History reviewed. No pertinent family history.  Current Outpatient  Medications   Medication Sig Dispense Refill     atorvastatin (LIPITOR) 20 MG tablet Take 20 mg by mouth bedtime.       cholecalciferol, vitamin D3, (VITAMIN D3) 1,000 unit capsule Take 1 capsule (1,000 Units total) by mouth daily.  0     clonazePAM (KLONOPIN) 0.5 MG tablet Take 1 tablet by mouth twice daily 60 tablet 0     coenzyme Q10 (COQ-10) 100 mg capsule Take 1 capsule (100 mg total) by mouth daily.  0     cyanocobalamin, vitamin B-12, (VITAMIN B-12) 1,000 mcg Subl Place 1 tablet (1,000 mcg total) under the tongue daily. 30 tablet 6     donepezil (ARICEPT) 5 MG tablet        finasteride (PROSCAR) 5 mg tablet Take 5 mg by mouth daily.              LORazepam (ATIVAN) 0.5 MG tablet TAKE 1 TABLET BY MOUTH EVERY 8 HOURS AS NEEDED FOR ANXIETY 25 tablet 0     magnesium chloride (SLOW-MAG) 64 mg TbEC delayed-release tablet Take 2 tablets (128 mg total) by mouth daily.  0     methylphenidate HCl (RITALIN) 5 MG tablet Take 1 tablet (5 mg total) by mouth 2 (two) times a day. 60 tablet 0     QUEtiapine (SEROQUEL) 25 MG tablet Take 1 tablet (25 mg total) by mouth at bedtime. 30 tablet 2     tamsulosin (FLOMAX) 0.4 mg cap Take 0.4 mg by mouth Daily after breakfast.       venlafaxine (EFFEXOR-XR) 150 MG 24 hr capsule Take 1 capsule (150 mg total) by mouth daily. 90 capsule 1     vitamin E 400 unit capsule Take 400 Units by mouth daily.       No current facility-administered medications for this encounter.        Allergies   Allergen Reactions     Penicillins Hives     Social History     Socioeconomic History     Marital status:      Spouse name: Not on file     Number of children: Not on file     Years of education: Not on file     Highest education level: Not on file   Occupational History     Not on file   Social Needs     Financial resource strain: Not on file     Food insecurity     Worry: Not on file     Inability: Not on file     Transportation needs     Medical: Not on file     Non-medical: Not on file    Tobacco Use     Smoking status: Never Smoker     Smokeless tobacco: Never Used   Substance and Sexual Activity     Alcohol use: No     Frequency: Never     Drug use: No     Sexual activity: Not on file   Lifestyle     Physical activity     Days per week: Not on file     Minutes per session: Not on file     Stress: Not on file   Relationships     Social connections     Talks on phone: Not on file     Gets together: Not on file     Attends [employer] service: Not on file     Active member of club or organization: Not on file     Attends meetings of clubs or organizations: Not on file     Relationship status: Not on file     Intimate partner violence     Fear of current or ex partner: Not on file     Emotionally abused: Not on file     Physically abused: Not on file     Forced sexual activity: Not on file   Other Topics Concern     Not on file   Social History Narrative     Not on file       The following portions of the patient's history were reviewed and updated as appropriate: allergies, current medications, past family history, past medical history, past social history, past surgical history and problem list.    Review of Systems  A comprehensive review of systems was negative except for:what is noted above    Mental Status Exam:   Appearance: .telephone visit, I did not visualize patient  Behavior:   . Wife reports no signs of agitation or restlessness  Speech: .did not participate in the conversation  Mood/Affect:   wife.denies any depression or anxiety, flat, no evidence of any shea or agitation  Thought Content: . Wife denies any psychosis at this time   Suicidal or Homicidal Thoughts:   .no evidence of any sucidality or homicidal ideations  Thought Process/Formulation:  .vague, able to track conversation  Associations: .impaired  Fund of Knowledge:  .impaired   Attention/Concentration: .impaired  Insight: .impaired, wife denies any recent change  Judgement:  impaired, wife denies any recent change.  Memory:    . impaired  Motor Status:  wife denies any recent change   Orientation: .oriented to person     Functional Testing  Last testing 11/16/16 ACL of 4.4, CPT of 4.7 indicating moderate functional impairment and the need for 24/7 supervision. These scores are similar to that of last year and indicate relative stability.      Diagnosis managed and treated at today's visit   Dementia of the Alzheimer's type  Depression  Anxiety   Major cognitive disorder  Attention and Concentration deficit  Confusion  Gait instability  Risk for falls     Plan:  Medication Adjustment:  No medication changes    Other:   Patient will return to clinic in 6 months. They agree to call or return sooner with any questions or concerns.  Risks and benefits were discussed.  Continue with his individual therapist.     Continue with the support of the clinic, reassurance, and redirection. Staff monitoring and ongoing assessments per team plan. Current psychotropic medication appears to represent the minimum effective dosage and appears medically necessary. We will continue to monitor and reassess. This team will utilize appropriate emergency services if necessary. I will make myself available if concerns or problems arise.    Total time spent with the patient today was 25 minutes with greater than 50% of the time spent in counseling and care coordination. The patient agrees to call before then with any questions, concerns or problems. We will assess for the appropriateness of possible psychotropic medication trials/changes. The patient will seek out appropriate emergency services should that become necessary.    Consent was obtained for this service by one of our care team members    Telephone Visit Details    Type of service: Telephone Visit    Phone Start Time: 1420    Phone End Time:  1445    Total time for phone call: 25 minutes    Originating Location: Patient's home    Distant Location:  [facility]  of Communication: Telephone call    Patient Instructions   It was nice speaking with you today for our office visit held over the phone. The following is a summary of our visit.    General Information:  Today you had your appointment with Shelbi Mendes CNP     If lab work was done today as part of your evaluation you will generally be contacted via My Chart, mail, or phone with the results within 1-5 days. If there is an alarming result we will contact you by phone. Lab results come back at varying times, I generally wait until all labs are resulted before making comments on results. Please note labs are automatically released to My Chart once available.     If you need refills please contact your pharmacist. They will send a refill request to me to review. Please allow 3 business days for us to process all refill requests.     Please call or send a medical message through My Chart, with any questions or concerns    If you need any paperwork completed please fax forms to [phone]. Please state if you would like a copy of the completed paperwork, mailed or faxed back to the patient and a fax number to fax the paperwork to. Please allow up to 10 days for paperwork to be completed.    Shelbi Mendes CNP

## 2021-06-13 NOTE — TELEPHONE ENCOUNTER
Patient's wife called to get an order in for hospice and would like to speak to Shelbi about this. She'd like a call back.    Migel Funez MA

## 2021-06-13 NOTE — PROGRESS NOTES
Occupational Therapy  Occupational Therapy    Medicare Insurance    Units: 1 Jabier  Total Minutes: 30    Medical Diagnosis: Dementia  Treatment Diagnosis: Cognitive Impairment  Referring Practitioner: Shelbi Mendes CNP  Date of onset: 11-1-17  Start of care: November 1, 2017    Mr.Michael EULALIA Diaz was referred by Shelbi Mendes CNP for an occupational therapy outpatient cognitive evaluation. The assessments used in this evaluation will help determine if cognitive impairment is present and if so, how functional daily activity may be affected.  Following the assessments, the occupational therapist may offer education and recommendations to assist caregivers in providing the optimal level of support for their loved one. Scott was seen on 11/1/2017 and was accompanied by wife, who remained in the waiting room. Sathish has been seen by OT for yearly cognitive evaluations and returns today, ambulating independently and nicely groomed and dressed. He was fully cooperative with the evaluation, but I quickly observed Sathish to have significant word finding difficulty and a slow processing speed with direction following. He stated he continues to live at home with his wife.     Pain: none stated    OBJECTIVE  Results of assessments are as follows:      11-1-17 11-16  11-15  ACL:   3.4 /5.8 4.2  4.4  CPT:   3.7 /5.6  4.2  4.7  MOCA:  6 /30  9  NT    The above assessment scores indicate a Moderate  and Severe level of impairment.     ASSESSMENT  Recommendations:  Cognitive functioning recommendations: 3.4-3.6: The assessment scores indicate a recommendation of close 24 hour supervision with a secure environment. Scott will benefit from 1-step cues to initiate and complete familiar tasks such as dressing, bathing, toileting, etc. She/he will often times need the task set-up as she/he lacks problem solving and judgment. Complex tasks such as meal preparation, medication and financial management should be done for Scott.  Behaviors are often not goal directed and perseveration on tasks will result. Driving is not recommended. Scott should be encouraged to participate in familiar or simple activities. Scott s attention span is significantly impaired and he needs cues to complete given tasks. Further, Scott will benefit from structured daily to eliminate the need to problem solve from one task to another as he lacks initiation of tasks.    Sathish's test scores indicate a significant decline in cognitive function compared to last year, which I informally observed in his word finding, direction following and problem solving performance.     Thank you for this referral.  If I can be of further assistance, please do not hesitate to contact me.    MEDICARE PATIENT: Yes: [facility] #[id number]; Provider # ; Certification Dates: from 11-1-17 to 11-1-17    Physician Recommendation:  1. I certify the need for these services furnished within this plan and while under my care. I agree with the therapist's recommendation for plan of care.  2. If there is any recommendation for modification of therapy plan, please indicate below.    Yanelis Armstrong, OT

## 2021-06-13 NOTE — PROGRESS NOTES
.  Assessment:     1. Dementia of the Alzheimer type.  2. Depression  3. Anxiety    Plan:     1. Increase Effexor   2. Labs and MRI  3. B12 shot IM  4. Return to clinic in 3 weeks    Patient presents to the [facility] loss clinic for a follow up visit, he was last seen by me on 16, where he titrated the patient off the Celexa.  Wife reports that the patient is sleeping well he goes to bed about 8 PM and wakes at 8 AM.  He has started to leave the house by himself, his dentist twice wife is now becoming concerned that he is not a safe alone in the house.  They have volunteer coming in every Monday and the patient also goes to [employer] four hours on Friday.  For 4 hours.  At the wife notes that there is been no behaviors but the patient's memory has declined significantly since the previous appointment.  The wife reports that he constantly wants to spend all this time with her.  That is why he left the house he was searching for her.  He used to be involved in writing a novel, now his nose activity is going through all the old stuff.  I will do an MRI and lab to check for no acute findings, I will also increase the Effexor to make sure there is no increase in depression.  We may also need to switch back to Celexa given the decline.  The patient also had a low B12 so I will give him an IM shot    Subjective:          He is a 77 y.o. male who initially presented to the memory loss clinic on 11 for evaluation of his cognitive and behavioral and functioning condition.  The patient's wife reports that the patient has demonstrated progressive changes in cognition, function and behavior dating back at least 3 years.  3 years prior the wife realized that the patient may be having trouble cognitively at the patient's father's  he appeared to be confused about family relationships.  His wife found this to be quite striking and began noticing that the patient was also having trouble finding his way while driving  to familiar locations and to be having some trouble with forgetting elements of conversation and direction.  At that time the patient does admit that he felt he was having trouble remembering the names of the children of close friends.  By 2009 problems have progressed to the point where the [facility]  An MRI of the brain revealed mild generalized cortical atrophy as well as a left posterior temporal cortical CVA.  Neuropsychometric testing revealed but would best be described as multiple domain MCI with problems and some prefrontal functions as well as new learning.  Wife was not convinced that the patient's problems had progressed, however, she does agree that she had been assuming more responsibilities in the home and had been adjusting to the patient's perceived deficits.  Over the past year the patient had developed a habit of consuming 2-4 alcoholic beverages per day which was quite unusual for the patient who never consumed alcohol but rarely in the past.  Dr. Dotson's patient at that time was dementia, likely dementia of the Alzheimer's type.  Rule out vascular dementia.  Multiple vascular risk factors.  Rule out depression.  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  Likely multifactorial in etiology, including vascular ischemic changes noted on the MRI.  He also had neuro imaging in September 2011, which did not reveal any significant changes, although was positive for an infarct noted in the posterior lateral left temporal lobe, which was thought to be a small chronic cortical infarct but stable since 2009.  9/28/11 the patient was started on Aricept.  11/1/12 wife thought that the patient's short-term memory seem fairly stable over the past year but did not  realize that he has an issue.  She noticed little problems when he tried to help around the house but could not generally redirect the patient.  Wife did note some signs of depression.  He will show some irritability on occasion and this seemed to be increased if she had alcohol.  Citalopram 10 mg was started.  11/7/13 wife reported that the patient is doing very well with minimal changes.  She stated he does well around the home and familiar environments.  The patient was exercising and still socializing with friends.  Clinical assessment was dementia of Alzheimer's type in the mild-to-moderate range.  Anxiety, likely depression, currently stable.  11/6/14 patient required 24-hour supervision to ensure safety.  Wife did believe the patient could be left alone from 1-4 hours at a time, but phones him to make sure that he's doing okay. Patient seemed to be doing fairly well with some progression in his functionality.  His mood appeared to be stable. 11/13/15 Wife noted subtle changes with cognition. She noted increased short-term memory loss and difficulty with problem solving  Wife noted that the patient becomes anxious, he will begin to push on his sternum and look a bit uncomfortable.  She initially thought that his heart was bothering him, so he saw his cardiologist and had a full workup but everything was normal.  She has been giving him lorazepam with these episodes which have completely extinguished the symptoms.  Anxiety occurs a couple of times per week.  One time his anxiety appeared to be a bit more severe, so she had to give him a second dose of lorazepam Function has declined a bit.  He was once very handy and could fix anything around the home, now he is having difficulty with this and needs to have one of his kids, to deal with the problem.  The patient is mostly independent with hands-on care, but requires reminders at times for changing his clothes and bathing.  His daily routine consists of writing  and watching television.  He also runs errands with his wife.  Wife has noted that he seems a bit more tired and will want to go to sleep during the day. Increase Celexa to 20 mg and change Namenda XR to Namenda IR 10 mg BID.  11/16/16  little progression noted in cognition or functionality. Wife reported no increase in anxiety over the past year. Lorazepam has been helpful in relieving symptoms.  The patient has been napping a bit more and I think this is likely because he does not happen of structure. They will attempt to plan their days a bit more to keep him busy, he has been very physically healthy and feels good.  Wife received a letter from her insurance company stating that Celexa would no longer be covered, start Effexer, if no adverse reactions I will attempt to titrate down Celexa and discontinue.  12/7/16  No side effects noted with new medication no change in mood. We will now try to titrate down the Celexa. Wife is to call clinic if she has any concerns or problems. Patient is leaving for vacation and will be gone for a couple of months, I will see him when he gets back.     Outside reports reviewed: None.       Patient Active Problem List    Diagnosis Date Noted     Benign non-nodular prostatic hyperplasia with lower urinary tract symptoms 09/08/2016     Anxiety 07/19/2014     Depression 07/19/2014     Dementia of the Alzheimer's type 07/19/2014     Essential Hypercholesterolemia      Hypertension      Coronary Artery Disease      No past medical history on file.  No past surgical history on file.  No family history on file.  Current Outpatient Prescriptions   Medication Sig Dispense Refill     aspirin 81 MG tablet Take 81 mg by mouth daily.       atorvastatin (LIPITOR) 20 MG tablet Take 20 mg by mouth bedtime.       donepezil (ARICEPT) 10 MG tablet TAKE 1 TABLET BY MOUTH EVERY MORNING 30 tablet 6     finasteride (PROSCAR) 5 mg tablet Take 5 mg by mouth at bedtime.       LORazepam (ATIVAN) 0.5 MG  tablet Take 1 tablet (0.5 mg total) by mouth 2 (two) times a day as needed for anxiety (1/2 - 1 tab). 60 tablet 3     memantine (NAMENDA) 10 MG tablet TAKE 1 TABLET (10 MG TOTAL) BY MOUTH 2 (TWO) TIMES A DAY. (Patient taking differently: TAKE 1 TABLET (10 MG TOTAL) BY MOUTH 2 (TWO) TIMES A DAY. 23mg) 60 tablet 11     venlafaxine (EFFEXOR-XR) 150 MG 24 hr capsule TAKE ONE CAPSULE EVERY DAY 30 capsule 6     clonazePAM (KLONOPIN) 0.5 MG tablet Take 0.5 tablets (0.25 mg total) by mouth 2 (two) times a day as needed for anxiety. 10 tablet 0     QUEtiapine (SEROQUEL) 25 MG tablet Take 0.5 tablets (12.5 mg total) by mouth at bedtime. 10 tablet 0     venlafaxine (EFFEXOR XR) 37.5 MG 24 hr capsule Take 1 capsule (37.5 mg total) by mouth daily. 30 capsule 2     No current facility-administered medications for this encounter.        Allergies   Allergen Reactions     Penicillins      Social History     Social History     Marital status:      Spouse name: N/A     Number of children: N/A     Years of education: N/A     Occupational History     Not on file.     Social History Main Topics     Smoking status: Never Smoker     Smokeless tobacco: Not on file     Alcohol use Not on file     Drug use: Not on file     Sexual activity: Not on file     Other Topics Concern     Not on file     Social History Narrative       The following portions of the patient's history were reviewed and updated as appropriate: allergies, current medications, past family history, past medical history, past social history, past surgical history and problem list.    Review of Systems  A comprehensive review of systems was negative except for: what is noted       Objective:     Vitals:    11/01/17 1355   BP: 101/62   Pulse: 61   Weight: 156 lb (70.8 kg)   Mental Status Examination  Patient is casually dressed, neatly groomed and seated for evaluation. There is no evidence of acute psychological distress. His behavior is socially appropriate, he is  cooperative with questioning and eye contact is good. He often uses humor when I ask him a question that he appears to have difficulty understanding her knowing the answer to. He interacts very well with me. He is alert and without obvious signs of distractibility/delirium. He is oriented to person and place. Speech is spontaneous, volume and rate of speech is normal, no halting of speech her speech apraxia noted. Thought processes are with very simple, goal-directed statements that completely lacking depth of content or provide any meaningful information. There is no evidence of auditory or visual hallucinations. No delusional ideation noted. His affect is mostly bright, but with very mild restriction and mood is congruent. Gen. fund of knowledge, insight and memory are all impaired  Functional Testing  Last testing 11/16/16 ACL of 4.4, CPT of 4.7 indicating moderate functional impairment and the need for 24/7 supervision. These scores are similar to that of last year and indicate relative stability.     I asked him to call with any questions or concerns and will see him again in clinic in about 3 weeks. Total time spent with the patient today was 50 minutes with greater than 50% of the time spent in counseling and care coordination.

## 2021-06-14 NOTE — PROGRESS NOTES
Persons accompanying you (the patient) today: Wife    How have you been doing since we saw you last? Please note any concerns.  F/u apt.  Pt is doing well. No new concerns.    Please list any recent hospitalizations/surgeries/procedures you've had since we saw you last:  None    Have you had any falls since your last visit? No    Do you have any pain today? No

## 2021-06-14 NOTE — PROGRESS NOTES
.  Assessment:     1. Dementia of the Alzheimer type.  2. Depression  3. Anxiety    Plan:     1. Continue with Klonopin  2. B12 shot IM X4, wife to do at home  3. Primary to redraw Vitamin B12, decide if this should be given on a regular basis  4. Return to clinic in 6 months    Patient presents to the Memory loss clinic for a follow up visit, he was last seen by me on 17, where I increase the patient's Effexor and if the wife Seroquel and Klonopin to trial to see if this would help with the patient's panic attacks.  Wife reports that the agent has not had an anxiety attack since last appointment.  Wife reports that things are going well but the patient has been sleeping quite a bit.  He usually will go to bed around 7 PM and wake at 8 in the morning.  Given that the patient did have a low B12 I will give him another IM today and given that the wife is a nurse I will give her 3 cats to do at home every week ×3.  Patient is also going to see his primary next month, so wife will ask primary to do another B12 lab and decide if the patient should be receiving B12 shots on a regular basis.  Wife denies any behaviors, falls, or signs or symptoms of depression.  We will continue with the Klonopin for the patient to prevent the anxiety attacks.  I am also hoping that the B12 injections to help the patient with his fatigue.    Subjective:          He is a 77 y.o. male who initially presented to the memory loss clinic on 11 for evaluation of his cognitive and behavioral and functioning condition.  The patient's wife reports that the patient has demonstrated progressive changes in cognition, function and behavior dating back at least 3 years.  3 years prior the wife realized that the patient may be having trouble cognitively at the patient's father's  he appeared to be confused about family relationships.  His wife found this to be quite striking and began noticing that the patient was also having trouble  finding his way while driving to familiar locations and to be having some trouble with forgetting elements of conversation and direction.  At that time the patient does admit that he felt he was having trouble remembering the names of the children of close friends.  By 2009 problems have progressed to the point where the [facility]  An MRI of the brain revealed mild generalized cortical atrophy as well as a left posterior temporal cortical CVA.  Neuropsychometric testing revealed but would best be described as multiple domain MCI with problems and some prefrontal functions as well as new learning.  Wife was not convinced that the patient's problems had progressed, however, she does agree that she had been assuming more responsibilities in the home and had been adjusting to the patient's perceived deficits.  Over the past year the patient had developed a habit of consuming 2-4 alcoholic beverages per day which was quite unusual for the patient who never consumed alcohol but rarely in the past.  Dr. Dotson's patient at that time was dementia, likely dementia of the Alzheimer's type.  Rule out vascular dementia.  Multiple vascular risk factors.  Rule out depression.  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  Likely multifactorial in etiology, including vascular ischemic changes noted on the MRI.  He also had neuro imaging in September 2011, which did not reveal any significant changes, although was positive for an infarct noted in the posterior lateral left temporal lobe, which was thought to be a small chronic cortical infarct but stable since 2009.  9/28/11 the patient was started on Aricept.  11/1/12 wife thought that the patient's short-term memory seem fairly stable over  the past year but did not realize that he has an issue.  She noticed little problems when he tried to help around the house but could not generally redirect the patient.  Wife did note some signs of depression.  He will show some irritability on occasion and this seemed to be increased if she had alcohol.  Citalopram 10 mg was started.  11/7/13 wife reported that the patient is doing very well with minimal changes.  She stated he does well around the home and familiar environments.  The patient was exercising and still socializing with friends.  Clinical assessment was dementia of Alzheimer's type in the mild-to-moderate range.  Anxiety, likely depression, currently stable.  11/6/14 patient required 24-hour supervision to ensure safety.  Wife did believe the patient could be left alone from 1-4 hours at a time, but phones him to make sure that he's doing okay. Patient seemed to be doing fairly well with some progression in his functionality.  His mood appeared to be stable. 11/13/15 Wife noted subtle changes with cognition. She noted increased short-term memory loss and difficulty with problem solving  Wife noted that the patient becomes anxious, he will begin to push on his sternum and look a bit uncomfortable.  She initially thought that his heart was bothering him, so he saw his cardiologist and had a full workup but everything was normal.  She has been giving him lorazepam with these episodes which have completely extinguished the symptoms.  Anxiety occurs a couple of times per week.  One time his anxiety appeared to be a bit more severe, so she had to give him a second dose of lorazepam Function has declined a bit.  He was once very handy and could fix anything around the home, now he is having difficulty with this and needs to have one of his kids, to deal with the problem.  The patient is mostly independent with hands-on care, but requires reminders at times for changing his clothes and bathing.  His daily  routine consists of writing and watching television.  He also runs errands with his wife.  Wife has noted that he seems a bit more tired and will want to go to sleep during the day. Increase Celexa to 20 mg and change Namenda XR to Namenda IR 10 mg BID.  11/16/16  little progression noted in cognition or functionality. Wife reported no increase in anxiety over the past year. Lorazepam has been helpful in relieving symptoms.  The patient has been napping a bit more and I think this is likely because he does not happen of structure. They will attempt to plan their days a bit more to keep him busy, he has been very physically healthy and feels good.  Wife received a letter from her insurance company stating that Celexa would no longer be covered, start Effexer, if no adverse reactions I will attempt to titrate down Celexa and discontinue.  12/7/16  No side effects noted with new medication no change in mood. We will now try to titrate down the Celexa. Wife is to call clinic if she has any concerns or problems. Patient is leaving for vacation and will be gone for a couple of months, I will see him when he gets back.  11/1/17  Wife reports that the patient is sleeping well he goes to bed about 8 PM and wakes at 8 AM.  He has started to leave the house by himself, he has done this twice, wife is now becoming concerned that he is not a safe alone in the house.  They have volunteer coming in every Monday and the patient also goes to [employer] four hours on Friday. Wife notes that there is been no behaviors but the patient's memory has declined significantly since the previous appointment.  The wife reports that he constantly wants to spend all this time with her.  That is why he left the house he was searching for her.  He used to be involved in writing a novel, now his nose activity is going through all the old stuff.  MRI, lab, increase the Effexor  We may also need to switch back to Celexa given the decline.  The  patient also had a low B12 so I will give him an IM shot. I will also have wife trial Seroquel and Klonopin to see if this helps with the patient's panic attacks.    Outside reports reviewed: None.       Patient Active Problem List    Diagnosis Date Noted     Benign non-nodular prostatic hyperplasia with lower urinary tract symptoms 09/08/2016     Anxiety 07/19/2014     Depression 07/19/2014     Dementia of the Alzheimer's type 07/19/2014     Essential Hypercholesterolemia      Hypertension      Coronary Artery Disease      No past medical history on file.  No past surgical history on file.  No family history on file.  Current Outpatient Prescriptions   Medication Sig Dispense Refill     aspirin 81 MG tablet Take 81 mg by mouth daily.       atorvastatin (LIPITOR) 20 MG tablet Take 20 mg by mouth bedtime.       clonazePAM (KLONOPIN) 0.5 MG tablet Take 0.5 tablets (0.25 mg total) by mouth 2 (two) times a day. 30 tablet 3     donepezil (ARICEPT) 10 MG tablet TAKE 1 TABLET BY MOUTH EVERY MORNING 30 tablet 6     finasteride (PROSCAR) 5 mg tablet Take 5 mg by mouth at bedtime.       LORazepam (ATIVAN) 0.5 MG tablet Take 1 tablet (0.5 mg total) by mouth 2 (two) times a day as needed for anxiety (1/2 - 1 tab). 60 tablet 3     memantine (NAMENDA) 10 MG tablet TAKE 1 TABLET (10 MG TOTAL) BY MOUTH 2 (TWO) TIMES A DAY. 60 tablet 9     QUEtiapine (SEROQUEL) 25 MG tablet Take 0.5 tablets (12.5 mg total) by mouth at bedtime. 10 tablet 0     venlafaxine (EFFEXOR XR) 37.5 MG 24 hr capsule Take 1 capsule (37.5 mg total) by mouth daily. 30 capsule 2     venlafaxine (EFFEXOR-XR) 150 MG 24 hr capsule TAKE ONE CAPSULE EVERY DAY 30 capsule 6     cyanocobalamin, vitamin B-12, 1,000 mcg/mL Kit Inject 1,000 mcg as directed every 7 days. 3 kit 0     Current Facility-Administered Medications   Medication Dose Route Frequency Provider Last Rate Last Dose     cyanocobalamin injection 1,000 mcg  1,000 mcg Intramuscular Once CLIFF Escobar            Allergies   Allergen Reactions     Penicillins      Social History     Social History     Marital status:      Spouse name: N/A     Number of children: N/A     Years of education: N/A     Occupational History     Not on file.     Social History Main Topics     Smoking status: Never Smoker     Smokeless tobacco: Not on file     Alcohol use Not on file     Drug use: Not on file     Sexual activity: Not on file     Other Topics Concern     Not on file     Social History Narrative       The following portions of the patient's history were reviewed and updated as appropriate: allergies, current medications, past family history, past medical history, past social history, past surgical history and problem list.    Review of Systems  A comprehensive review of systems was negative except for: what is noted       Objective:     Vitals:    12/12/17 0851   BP: 119/65   Pulse: 62   Weight: 168 lb (76.2 kg)   Mental Status Examination  Patient is casually dressed, neatly groomed and seated for evaluation. There is no evidence of acute psychological distress. His behavior is socially appropriate, he is cooperative with questioning and eye contact is good. He often uses humor when I ask him a question that he appears to have difficulty understanding her knowing the answer to. He interacts very well with me. He is alert and without obvious signs of distractibility/delirium. He is oriented to person and place. Speech is spontaneous, volume and rate of speech is normal, no halting of speech her speech apraxia noted. Thought processes are with very simple, goal-directed statements that completely lacking depth of content or provide any meaningful information. There is no evidence of auditory or visual hallucinations. No delusional ideation noted. His affect is mostly bright, but with very mild restriction and mood is congruent. Gen. fund of knowledge, insight and memory are all impaired  Functional Testing  Last testing  11/16/16 ACL of 4.4, CPT of 4.7 indicating moderate functional impairment and the need for 24/7 supervision. These scores are similar to that of last year and indicate relative stability.     I asked him to call with any questions or concerns and will see him again in clinic in about 6 months. Total time spent with the patient today was 30 minutes with greater than 50% of the time spent in counseling and care coordination.       .

## 2021-06-16 PROBLEM — S06.5XAA SDH (SUBDURAL HEMATOMA) (H): Status: ACTIVE | Noted: 2019-02-21

## 2021-06-16 PROBLEM — I62.9 INTRACRANIAL HEMORRHAGE (H): Status: ACTIVE | Noted: 2019-02-21

## 2021-06-18 NOTE — PROGRESS NOTES
Persons accompanying you (the patient) today: Wife    How have you been doing since we saw you last? Please note any concerns.  Pt is being seen for a ML f/u apt.  Pt is doing okay according to wife.  His pulse was low so I checked his SpO2 level which was at 99%    Please list any recent hospitalizations/surgeries/procedures you've had since we saw you last:  None    Have you had any falls since your last visit? No    Do you have any pain today? No

## 2021-06-18 NOTE — PROGRESS NOTES
.  Assessment:     1. Dementia of the Alzheimer type.  2. Depression  3. Anxiety    Plan:     1. Continue with Klonopin  2. Check B12  3. Return to clinic in 6 months    Patient presents to the Memory loss clinic for a follow up visit, he was last seen by me on 17, where B12 IM injections are given to patient.  Wife reports that patient continues to do well with taking Klonopin twice daily.  Wife reports the patient does have day programming and , she has a bone to recommend for 2 hours on Thursday, and a CNA comes in for 4 hours on .  Patient continues to enjoy spending time with family.  Wife reports the patient eats well, sleeps well.  Patient did not have a lot of anxiety over not seeing why, wife reports that this has subsided since starting the Klonopin.  Wife reports that patient has not had any chest pain or discomfort since Klonopin has been started reports that patient continues have 98% good days.  She reports a 2% days the patient is more quiet and not quite engaged.  Wife denies any problems or concerns already do a B12 on the patient to make sure that his B12 levels are within normal limits nurse will call wife if there is any concerns.  Wife denies any problems or concerns, she does note slight progression in the patient's cognition difficulties.    Subjective:          He is a 77 y.o. male who initially presented to the memory loss clinic on 11 for evaluation of his cognitive and behavioral and functioning condition.  The patient's wife reports that the patient has demonstrated progressive changes in cognition, function and behavior dating back at least 3 years.  3 years prior the wife realized that the patient may be having trouble cognitively at the patient's father's  he appeared to be confused about family relationships.  His wife found this to be quite striking and began noticing that the patient was also having trouble finding his way while driving to familiar  locations and to be having some trouble with forgetting elements of conversation and direction.  At that time the patient does admit that he felt he was having trouble remembering the names of the children of close friends.  By 2009 problems have progressed to the point where the patient [facility]  An MRI of the brain revealed mild generalized cortical atrophy as well as a left posterior temporal cortical CVA.  Neuropsychometric testing revealed but would best be described as multiple domain MCI with problems and some prefrontal functions as well as new learning.  Wife was not convinced that the patient's problems had progressed, however, she does agree that she had been assuming more responsibilities in the home and had been adjusting to the patient's perceived deficits.  Over the past year the patient had developed a habit of consuming 2-4 alcoholic beverages per day which was quite unusual for the patient who never consumed alcohol but rarely in the past.  Dr. Dotson's patient at that time was dementia, likely dementia of the Alzheimer's type.  Rule out vascular dementia.  Multiple vascular risk factors.  Rule out depression.  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  Likely multifactorial in etiology, including vascular ischemic changes noted on the MRI.  He also had neuro imaging in September 2011, which did not reveal any significant changes, although was positive for an infarct noted in the posterior lateral left temporal lobe, which was thought to be a small chronic cortical infarct but stable since 2009.  9/28/11 the patient was started on Aricept.  11/1/12 wife thought that the patient's short-term memory seem fairly stable over the past year but did not realize that he  has an issue.  She noticed little problems when he tried to help around the house but could not generally redirect the patient.  Wife did note some signs of depression.  He will show some irritability on occasion and this seemed to be increased if she had alcohol.  Citalopram 10 mg was started.  11/7/13 wife reported that the patient is doing very well with minimal changes.  She stated he does well around the home and familiar environments.  The patient was exercising and still socializing with friends.  Clinical assessment was dementia of Alzheimer's type in the mild-to-moderate range.  Anxiety, likely depression, currently stable.  11/6/14 patient required 24-hour supervision to ensure safety.  Wife did believe the patient could be left alone from 1-4 hours at a time, but phones him to make sure that he's doing okay. Patient seemed to be doing fairly well with some progression in his functionality.  His mood appeared to be stable. 11/13/15 Wife noted subtle changes with cognition. She noted increased short-term memory loss and difficulty with problem solving  Wife noted that the patient becomes anxious, he will begin to push on his sternum and look a bit uncomfortable.  She initially thought that his heart was bothering him, so he saw his cardiologist and had a full workup but everything was normal.  She has been giving him lorazepam with these episodes which have completely extinguished the symptoms.  Anxiety occurs a couple of times per week.  One time his anxiety appeared to be a bit more severe, so she had to give him a second dose of lorazepam Function has declined a bit.  He was once very handy and could fix anything around the home, now he is having difficulty with this and needs to have one of his kids, to deal with the problem.  The patient is mostly independent with hands-on care, but requires reminders at times for changing his clothes and bathing.  His daily routine consists of writing and watching  television.  He also runs errands with his wife.  Wife has noted that he seems a bit more tired and will want to go to sleep during the day. Increase Celexa to 20 mg and change Namenda XR to Namenda IR 10 mg BID.  11/16/16  little progression noted in cognition or functionality. Wife reported no increase in anxiety over the past year. Lorazepam has been helpful in relieving symptoms.  The patient has been napping a bit more and I think this is likely because he does not happen of structure. They will attempt to plan their days a bit more to keep him busy, he has been very physically healthy and feels good.  Wife received a letter from her insurance company stating that Celexa would no longer be covered, start Effexer, if no adverse reactions I will attempt to titrate down Celexa and discontinue.  12/7/16  No side effects noted with new medication no change in mood. We will now try to titrate down the Celexa. Wife is to call clinic if she has any concerns or problems. Patient is leaving for vacation and will be gone for a couple of months, I will see him when he gets back.  11/1/17  Wife reports that the patient is sleeping well he goes to bed about 8 PM and wakes at 8 AM.  He has started to leave the house by himself, he has done this twice, wife is now becoming concerned that he is not a safe alone in the house.  They have volunteer coming in every Monday and the patient also goes to [facility] four hours on Friday. Wife notes that there is been no behaviors but the patient's memory has declined significantly since the previous appointment.  The wife reports that he constantly wants to spend all this time with her.  That is why he left the house he was searching for her.  He used to be involved in writing a novel, now his nose activity is going through all the old stuff.  MRI, lab, increase the Effexor  We may also need to switch back to Celexa given the decline.  The patient also had a low B12 so I will give him  an IM shot. I will also have wife trial Seroquel and Klonopin to see if this helps with the patient's panic attacks.  12/12/17  Wife reports that the agent has not had an anxiety attack since last appointment.  Wife reports that things are going well but the patient has been sleeping quite a bit.  He usually will go to bed around 7 PM and wake at 8 in the morning. IM B12 was given X 4.  Wife denies any behaviors, falls, or signs or symptoms of depression.  We will continue with the Klonopin for the patient to prevent the anxiety attacks.  Outside reports reviewed: None.       Patient Active Problem List    Diagnosis Date Noted     Benign non-nodular prostatic hyperplasia with lower urinary tract symptoms 09/08/2016     Anxiety 07/19/2014     Depression 07/19/2014     Dementia of the Alzheimer's type 07/19/2014     Essential Hypercholesterolemia      Hypertension      Coronary Artery Disease      No past medical history on file.  No past surgical history on file.  No family history on file.  Current Outpatient Prescriptions   Medication Sig Dispense Refill     aspirin 81 MG tablet Take 81 mg by mouth daily.       atorvastatin (LIPITOR) 20 MG tablet Take 20 mg by mouth bedtime.       clonazePAM (KLONOPIN) 0.5 MG tablet TAKE 1/2 TABLET BY MOUTH TWICE A DAY 30 tablet 3     cyanocobalamin, vitamin B-12, 1,000 mcg/mL Kit Inject 1,000 mcg as directed every 7 days. 3 kit 0     donepezil (ARICEPT) 10 MG tablet TAKE 1 TABLET BY MOUTH EVERY MORNING 30 tablet 6     finasteride (PROSCAR) 5 mg tablet Take 5 mg by mouth at bedtime.       LORazepam (ATIVAN) 0.5 MG tablet Take 1 tablet (0.5 mg total) by mouth 2 (two) times a day as needed for anxiety (1/2 - 1 tab). 60 tablet 3     memantine (NAMENDA) 10 MG tablet TAKE 1 TABLET (10 MG TOTAL) BY MOUTH 2 (TWO) TIMES A DAY. 60 tablet 9     QUEtiapine (SEROQUEL) 25 MG tablet Take 0.5 tablets (12.5 mg total) by mouth at bedtime. 10 tablet 0     venlafaxine (EFFEXOR XR) 37.5 MG 24 hr  capsule Take 1 capsule (37.5 mg total) by mouth daily. 30 capsule 2     venlafaxine (EFFEXOR-XR) 150 MG 24 hr capsule TAKE ONE CAPSULE EVERY DAY 30 capsule 6     Current Facility-Administered Medications   Medication Dose Route Frequency Provider Last Rate Last Dose     cyanocobalamin injection 1,000 mcg  1,000 mcg Intramuscular Once CLIFF Escobar           Allergies   Allergen Reactions     Penicillins      Social History     Social History     Marital status:      Spouse name: N/A     Number of children: N/A     Years of education: N/A     Occupational History     Not on file.     Social History Main Topics     Smoking status: Never Smoker     Smokeless tobacco: Not on file     Alcohol use Not on file     Drug use: Not on file     Sexual activity: Not on file     Other Topics Concern     Not on file     Social History Narrative       The following portions of the patient's history were reviewed and updated as appropriate: allergies, current medications, past family history, past medical history, past social history, past surgical history and problem list.    Review of Systems  A comprehensive review of systems was negative except for: what is noted       Objective:     Vitals:    06/05/18 1107   BP: 122/59   Pulse: (!) 47   SpO2: 99%   Mental Status Examination  Patient is casually dressed, neatly groomed and seated for evaluation. There is no evidence of acute psychological distress. His behavior is socially appropriate, he is cooperative with questioning and eye contact is good. He often uses humor when I ask him a question that he appears to have difficulty understanding her knowing the answer to. He interacts very well with me. He is alert and without obvious signs of distractibility/delirium. He is oriented to person and place. Speech is spontaneous, volume and rate of speech is normal, no halting of speech her speech apraxia noted. Thought processes are with very simple, goal-directed  statements that completely lacking depth of content or provide any meaningful information. There is no evidence of auditory or visual hallucinations. No delusional ideation noted. His affect is mostly bright, but with very mild restriction and mood is congruent. Gen. fund of knowledge, insight and memory are all impaired  Functional Testing  Last testing 11/16/16 ACL of 4.4, CPT of 4.7 indicating moderate functional impairment and the need for 24/7 supervision. These scores are similar to that of last year and indicate relative stability.     I asked him to call with any questions or concerns and will see him again in clinic in about 6 months. Total time spent with the patient today was 30 minutes with greater than 50% of the time spent in counseling and care coordination.       ..

## 2021-06-20 NOTE — PROGRESS NOTES
[facility] Follow-up Note    Assessment & Plan        1. Coronary atherosclerosis due to lipid rich plaque  - per angiography in 2006 the left main was normal, proximal left anterior descending had a 10-20% lesion. Circumflex was normal and the first obtuse marginal artery had a proximal 95% lesion which received a drug-coated stent. The ramus had a proximal 80% lesion which is too small to intervene upon and the right coronary artery had a mid 10-20% lesion.  He had some vague pinging symptoms last year and scheduled for nuclear stress test which showed no major ischemia or scar so we are work on prevention.  Did speak with wife that given his advanced dementia we might want to back off on prevention but at this point time continue current medications.   2. Essential Hypercholesterolemia -cholesterol 132 with an LDL of 79 but this is from November 2016 on atorvastatin 20 mg.  We will have them get cholesterol rechecked with primary and get me the results.  Discuss at that time whether we would want to go up or down on the dose of atorvastatin given his advanced dementia.   3. Hypertension -under good control currently.   4. Early onset Alzheimer's dementia without behavioral disturbance -probably advanced stage V and on several medications including Namenda, Aricept, Seroquel, Klonopin as well as occasional lorazepam.     Plan  1.  Primary physician to check cholesterol and forward results to me.  2.  Follow-up with me in 1 year or sooner if needed.    Subjective  CC: 77 soon-to-be 78-year-old white gentleman here with his wife for yearly follow-up today.  He has had a significant mental decline in the last year and is on numerous agents for this.  He still lives at home independently with a wife but has some  as well as is in a program in [place].  They denied chest discomfort, palpitations, shortness of breath, PND, orthopnea or peripheral edema.    Medications  Current Outpatient  "Prescriptions   Medication Sig     aspirin 81 MG tablet Take 81 mg by mouth daily.     atorvastatin (LIPITOR) 20 MG tablet Take 20 mg by mouth bedtime.     clonazePAM (KLONOPIN) 0.5 MG tablet TAKE 1/2 TABLET BY MOUTH TWICE A DAY     cyanocobalamin, vitamin B-12, (VITAMIN B-12) 1,000 mcg Subl Place 1 tablet (1,000 mcg total) under the tongue daily.     donepezil (ARICEPT) 10 MG tablet Take 1 tablet (10 mg total) by mouth every morning.     finasteride (PROSCAR) 5 mg tablet Take 5 mg by mouth at bedtime.     LORazepam (ATIVAN) 0.5 MG tablet Take 1 tablet (0.5 mg total) by mouth 2 (two) times a day as needed for anxiety (1/2 - 1 tab).     memantine (NAMENDA) 10 MG tablet TAKE 1 TABLET (10 MG TOTAL) BY MOUTH 2 (TWO) TIMES A DAY.     QUEtiapine (SEROQUEL) 25 MG tablet Take 0.5 tablets (12.5 mg total) by mouth at bedtime.     venlafaxine (EFFEXOR-XR) 150 MG 24 hr capsule TAKE ONE CAPSULE EVERY DAY       Objective  /60 (Patient Site: Left Arm, Patient Position: Sitting, Cuff Size: Adult Regular)  Pulse 62  Resp 12  Ht 5' 10\" (1.778 m)  Wt 162 lb (73.5 kg)  BMI 23.24 kg/m2    General Appearance:    Alert, cooperative, no distress, appears stated age   Head:    Normocephalic, without obvious abnormality, atraumatic   Throat:   Lips, mucosa, and tongue normal; teeth and gums normal   Neck:   Supple, symmetrical, trachea midline, no adenopathy;        thyroid:  No enlargement/tenderness/nodules; no carotid    bruit or JVD   Back:     Symmetric, no curvature, ROM normal, no CVA tenderness   Lungs:     Clear to auscultation bilaterally, respirations unlabored   Chest wall:    No tenderness or deformity   Heart:    Regular rate and rhythm, S1 and S2 normal, no murmur, rub   or gallop   Abdomen:     Soft, non-tender, bowel sounds active all four quadrants,     no masses, no organomegaly   Extremities:   Normal, atraumatic, no cyanosis or edema   Pulses:   2+ and symmetric all extremities   Skin:   Skin color, texture, " turgor normal, no rashes or lesions     Results    Lab Results personally reviewed   Lab Results   Component Value Date    CHOL 132 11/03/2016    CHOL 141 09/22/2015     Lab Results   Component Value Date    HDL 36 (L) 11/03/2016    HDL 50 09/22/2015     Lab Results   Component Value Date    LDLCALC 79 11/03/2016    LDLCALC 71 09/22/2015     Lab Results   Component Value Date    TRIG 85 11/03/2016    TRIG 98 09/22/2015     Lab Results   Component Value Date    WBC 5.7 11/01/2017    HGB 15.2 11/01/2017    HCT 44.7 11/01/2017     11/01/2017     Lab Results   Component Value Date    CREATININE 1.02 11/01/2017    BUN 15 11/01/2017     11/01/2017    K 4.4 11/01/2017    CO2 30 11/01/2017     Review of Systems:   General: WNL  Eyes: WNL  Ears/Nose/Throat: WNL  Lungs: WNL  Heart: WNL  Stomach: WNL  Bladder: WNL  Muscle/Joints: WNL  Skin: WNL  Nervous System: WNL  Mental Health: Confusion, Anxiety     Blood: WNL

## 2021-06-22 NOTE — PROGRESS NOTES
Persons accompanying you (the patient) today: Spouse, Sydnie    How have you been doing since we saw you last? Please note any concerns.  Pt has been doing well. No concerns at this time.     Please list any recent hospitalizations/surgeries/procedures you've had since we saw you last:  None     Have you had any falls since your last visit? No    Do you have any pain today? No

## 2021-06-22 NOTE — PROGRESS NOTES
.  Assessment:     1. Dementia of the Alzheimer type.  2. Depression  3. Anxiety    Plan:     1. Continue with Klonopin  2. No changes in medication  3. Return to clinic in 6 months    Patient presents to the Memory loss clinic for a follow up visit, he was last seen by me on 18, no medication changes were made.  Wife reports that patient still is doing well.  He is sleeping well and waking feeling rested.  Wife also reports that she believes the patient's anxiety is under good control since starting the Klonopin.  The patient reports that his mood is good and wife agrees.  Wife reports that she does not believe that the patient enjoys things like he used to.  I discussed maybe adding a more structured environment for the patient.  Wife and patient will now be taking care of their grandchild during the day so the patient will no longer have help coming into the whole.  Wife has noted a slight decline with cognition but overall wife denies any problems or concerns.  Wife and patient both deny any falls or decrease in appetite.  We will continue with current therapies.    Subjective:          He is a 78 y.o. male who initially presented to the memory loss clinic on 11 for evaluation of his cognitive and behavioral and functioning condition.  The patient's wife reports that the patient has demonstrated progressive changes in cognition, function and behavior dating back at least 3 years.  3 years prior the wife realized that the patient may be having trouble cognitively at the patient's father's  he appeared to be confused about family relationships.  His wife found this to be quite striking and began noticing that the patient was also having trouble finding his way while driving to familiar locations and to be having some trouble with forgetting elements of conversation and direction.  At that time the patient does admit that he felt he was having trouble remembering the names of the children of close  friends.  By 2009 problems have progressed to the point where the patient [facility]  An MRI of the brain revealed mild generalized cortical atrophy as well as a left posterior temporal cortical CVA.  Neuropsychometric testing revealed but would best be described as multiple domain MCI with problems and some prefrontal functions as well as new learning.  Wife was not convinced that the patient's problems had progressed, however, she does agree that she had been assuming more responsibilities in the home and had been adjusting to the patient's perceived deficits.  Over the past year the patient had developed a habit of consuming 2-4 alcoholic beverages per day which was quite unusual for the patient who never consumed alcohol but rarely in the past.  Dr. Dotson's patient at that time was dementia, likely dementia of the Alzheimer's type.  Rule out vascular dementia.  Multiple vascular risk factors.  Rule out depression.  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  Likely multifactorial in etiology, including vascular ischemic changes noted on the MRI.  He also had neuro imaging in September 2011, which did not reveal any significant changes, although was positive for an infarct noted in the posterior lateral left temporal lobe, which was thought to be a small chronic cortical infarct but stable since 2009.  9/28/11 the patient was started on Aricept.  11/1/12 wife thought that the patient's short-term memory seem fairly stable over the past year but did not realize that he has an issue.  She noticed little problems when he tried to help around the house but could not generally redirect the patient.  Wife did note some signs of depression.  He will show some irritability on occasion  and this seemed to be increased if she had alcohol.  Citalopram 10 mg was started.  11/7/13 wife reported that the patient is doing very well with minimal changes.  She stated he does well around the home and familiar environments.  The patient was exercising and still socializing with friends.  Clinical assessment was dementia of Alzheimer's type in the mild-to-moderate range.  Anxiety, likely depression, currently stable.  11/6/14 patient required 24-hour supervision to ensure safety.  Wife did believe the patient could be left alone from 1-4 hours at a time, but phones him to make sure that he's doing okay. Patient seemed to be doing fairly well with some progression in his functionality.  His mood appeared to be stable. 11/13/15 Wife noted subtle changes with cognition. She noted increased short-term memory loss and difficulty with problem solving  Wife noted that the patient becomes anxious, he will begin to push on his sternum and look a bit uncomfortable.  She initially thought that his heart was bothering him, so he saw his cardiologist and had a full workup but everything was normal.  She has been giving him lorazepam with these episodes which have completely extinguished the symptoms.  Anxiety occurs a couple of times per week.  One time his anxiety appeared to be a bit more severe, so she had to give him a second dose of lorazepam Function has declined a bit.  He was once very handy and could fix anything around the home, now he is having difficulty with this and needs to have one of his kids, to deal with the problem.  The patient is mostly independent with hands-on care, but requires reminders at times for changing his clothes and bathing.  His daily routine consists of writing and watching television.  He also runs errands with his wife.  Wife has noted that he seems a bit more tired and will want to go to sleep during the day. Increase Celexa to 20 mg and change Namenda XR to Namenda IR 10 mg  BID.  11/16/16  little progression noted in cognition or functionality. Wife reported no increase in anxiety over the past year. Lorazepam has been helpful in relieving symptoms.  The patient has been napping a bit more and I think this is likely because he does not happen of structure. They will attempt to plan their days a bit more to keep him busy, he has been very physically healthy and feels good.  Wife received a letter from her insurance company stating that Celexa would no longer be covered, start Effexer, if no adverse reactions I will attempt to titrate down Celexa and discontinue.  12/7/16  No side effects noted with new medication no change in mood. We will now try to titrate down the Celexa. Wife is to call clinic if she has any concerns or problems. Patient is leaving for vacation and will be gone for a couple of months, I will see him when he gets back.  11/1/17  Wife reports that the patient is sleeping well he goes to bed about 8 PM and wakes at 8 AM.  He has started to leave the house by himself, he has done this twice, wife is now becoming concerned that he is not a safe alone in the house.  They have volunteer coming in every Monday and the patient also goes to [facility] four hours on Friday. Wife notes that there is been no behaviors but the patient's memory has declined significantly since the previous appointment.  The wife reports that he constantly wants to spend all this time with her.  That is why he left the house he was searching for her.  He used to be involved in writing a novel, now his nose activity is going through all the old stuff.  MRI, lab, increase the Effexor  We may also need to switch back to Celexa given the decline.  The patient also had a low B12 so I will give him an IM shot. I will also have wife trial Seroquel and Klonopin to see if this helps with the patient's panic attacks.  12/12/17  Wife reports that the agent has not had an anxiety attack since last  appointment.  Wife reports that things are going well but the patient has been sleeping quite a bit.  He usually will go to bed around 7 PM and wake at 8 in the morning. IM B12 was given X 4.  Wife denies any behaviors, falls, or signs or symptoms of depression.  We will continue with the Klonopin for the patient to prevent the anxiety attacks.  6/12/18  Wife reports that patient continues to do well with taking Klonopin twice daily.  Wife reports the patient does have day programming and Fridays, she has a bone to recommend for 2 hours on Thursday, and a CNA comes in for 4 hours on Wednesdays.  Patient continues to enjoy spending time with family.  Wife reports the patient eats well, sleeps well.  Patient did not have a lot of anxiety over not seeing why, wife reports that this has subsided since starting the Klonopin.  Wife reports that patient has not had any chest pain or discomfort since Klonopin has been started reports that patient continues have 98% good days.  She reports a 2% days the patient is more quiet and not quite engaged. Wife denies any problems or concerns, she does note slight progression in the patient's cognition difficulties.    Outside reports reviewed: None.       Patient Active Problem List    Diagnosis Date Noted     Benign non-nodular prostatic hyperplasia with lower urinary tract symptoms 09/08/2016     Anxiety 07/19/2014     Depression 07/19/2014     Dementia of the Alzheimer's type 07/19/2014     Essential Hypercholesterolemia      Hypertension      Coronary Artery Disease      No past medical history on file.  No past surgical history on file.  No family history on file.  Current Outpatient Medications   Medication Sig Dispense Refill     aspirin 81 MG tablet Take 81 mg by mouth daily.       atorvastatin (LIPITOR) 20 MG tablet Take 20 mg by mouth bedtime.       clonazePAM (KLONOPIN) 0.5 MG tablet TAKE 1/2 TABLET BY MOUTH TWICE A DAY 30 tablet 3     clonazePAM (KLONOPIN) 0.5 MG tablet  TAKE 1/2 TABLET BY MOUTH TWICE A DAY 30 tablet 3     cyanocobalamin, vitamin B-12, (VITAMIN B-12) 1,000 mcg Subl Place 1 tablet (1,000 mcg total) under the tongue daily. 30 tablet 6     donepezil (ARICEPT) 10 MG tablet Take 1 tablet (10 mg total) by mouth every morning. 90 tablet 1     finasteride (PROSCAR) 5 mg tablet Take 5 mg by mouth at bedtime.       LORazepam (ATIVAN) 0.5 MG tablet Take 1 tablet (0.5 mg total) by mouth 2 (two) times a day as needed for anxiety (1/2 - 1 tab). 60 tablet 3     memantine (NAMENDA) 10 MG tablet TAKE 1 TABLET (10 MG TOTAL) BY MOUTH 2 (TWO) TIMES A DAY. 60 tablet 6     QUEtiapine (SEROQUEL) 25 MG tablet Take 0.5 tablets (12.5 mg total) by mouth at bedtime. 10 tablet 0     venlafaxine (EFFEXOR-XR) 150 MG 24 hr capsule Take 1 capsule (150 mg total) by mouth daily. 90 capsule 1     No current facility-administered medications for this encounter.        Allergies   Allergen Reactions     Penicillins      Social History     Socioeconomic History     Marital status:      Spouse name: Not on file     Number of children: Not on file     Years of education: Not on file     Highest education level: Not on file   Social Needs     Financial resource strain: Not on file     Food insecurity - worry: Not on file     Food insecurity - inability: Not on file     Transportation needs - medical: Not on file     Transportation needs - non-medical: Not on file   Occupational History     Not on file   Tobacco Use     Smoking status: Never Smoker   Substance and Sexual Activity     Alcohol use: Not on file     Drug use: Not on file     Sexual activity: Not on file   Other Topics Concern     Not on file   Social History Narrative     Not on file       The following portions of the patient's history were reviewed and updated as appropriate: allergies, current medications, past family history, past medical history, past social history, past surgical history and problem list.    Review of Systems  A  comprehensive review of systems was negative except for: what is noted       Objective:     Vitals:    01/08/19 1102   BP: 110/67   Pulse: (!) 57   Weight: 160 lb (72.6 kg)   Mental Status Examination  Patient is casually dressed, neatly groomed and seated for evaluation. There is no evidence of acute psychological distress. His behavior is socially appropriate, he is cooperative with questioning and eye contact is good. He often uses humor when I ask him a question that he appears to have difficulty understanding her knowing the answer to. He interacts very well with me. He is alert and without obvious signs of distractibility/delirium. He is oriented to person and place. Speech is spontaneous, volume and rate of speech is normal, no halting of speech her speech apraxia noted. Thought processes are with very simple, goal-directed statements that completely lacking depth of content or provide any meaningful information. There is no evidence of auditory or visual hallucinations. No delusional ideation noted. His affect is mostly bright, but with very mild restriction and mood is congruent. Gen. fund of knowledge, insight and memory are all impaired  Functional Testing  Last testing 11/16/16 ACL of 4.4, CPT of 4.7 indicating moderate functional impairment and the need for 24/7 supervision. These scores are similar to that of last year and indicate relative stability.     I asked him to call with any questions or concerns and will see him again in clinic in about 6 months. Total time spent with the patient today was 30 minutes with greater than 50% of the time spent in counseling and care coordination.

## 2021-06-24 NOTE — TELEPHONE ENCOUNTER
PATIENT NAME:  Scott Diaz  YOB: 1940  MRN: [id number]  SURGEON: DR. REYNOLDS  DATE of CONSULT:  2-21-19  DIAGNOSIS: SDH    FOLLOW-UP:      Post HOSPITAL CONSULT F/U Visit: 2 weeks   Post-op Provider: NP  DIAGNOSTICS:  radiology: CT scan: HEAD, WITHOUT  (ORDERED 2-22-19)  DISPOSITION:  HOME WITH FAMILY 2-22-19*    ADDITIONAL INSTRUCTIONS FOR MEDICAL STAFF:

## 2021-06-24 NOTE — PATIENT INSTRUCTIONS - HE
1. Plan to see us as needed at your request  2. Encourage follow up with Neurology if concerns of confusion continue.

## 2021-06-24 NOTE — PROGRESS NOTES
"NEUROSURGERY FOLLOW UP EVALUATION:    ASSESSMENT/ PLAN:  Scott Diaz is a 78 y.o. male, who presents today status post fall sustaining hemorrhage along the right tentorium 1 to 2 mm thickness, no mass effect. Serial image unchanged. Patient also had shallow contusion/subgaleal hematoma along the posterior superior parietal scalp. Image today 3/8/2019 shows almost complete resolution of hemorrhage.     Today he reports no symptoms. He is alert and at his baseline. Gait is steady. Denies headache, vision changes, dizziness, numbness or tingling. Wife has some concern for confusion. Patient has long history of Alzheimer's and see's providers at [place] twice a year. Wife not concerned enough to make a sooner appointment.     Plan includes seeing patient as needed at patient's wife's request which seems reasonable.    EXAM:  Visit Vitals  /53   Pulse 71   Ht 5' 7\" (1.702 m)   Wt 158 lb (71.7 kg)   SpO2 99%   BMI 24.75 kg/m      Alert and oriented x3, speech normal  PERRL, EOMI, face symmetric, tongue midline, shoulder shrug equal  No pronator drift, finger to nose smooth and accurate bilaterally  Arm strength: 5/5  Leg strength: 5/5   Bulk and tone: normal  Reflexes: biceps, triceps, brachioradialis, patellar and achilles 2+  No pathological reflexes   Gait normal     IMAGING:  The imaging was personally reviewed by me.     Head CT  INTRACRANIAL CONTENTS: There is trace residual subdural hematoma along the right cerebellar tentorium (coronal images #[id number]). No new intracranial hemorrhage, extraaxial collection, or mass effect.  No CT evidence of acute infarct. Mild presumed chronic   small vessel ischemic changes. Moderate generalized volume loss. No hydrocephalus.      VISUALIZED ORBITS/SINUSES/MASTOIDS: Prior bilateral cataract surgery. Visualized portions of the orbits are otherwise unremarkable. No significant paranasal sinus mucosal disease. No significant middle ear or mastoid effusion.     OSSEOUS " STRUCTURES/SOFT TISSUES: No significant abnormality.     CONCLUSION:  1.  Near complete interval resolution of thin right cerebellar tentorium subdural hematoma. No new intracranial hemorrhage or mass effect.  2.  Stable chronic age-related changes.    BART Martinez-[facility]  O: [phone]

## 2021-06-24 NOTE — PROGRESS NOTES
Wife returned holter monitor on 2/26/19 that was ordered by hospitalist.  Wife wrote a note that due to the patient's memory loss, it was hard to keep the monitor on and he ended up only wearing it for 1 hour.  Heart care credited the monitor charge (1 hour is not enough information to read) and is following up with both the wife and patient's primary physician to let them know the patient was not a good candidate for holter monitor.

## 2021-07-03 NOTE — ADDENDUM NOTE
Addendum Note by Thalia Echevarria CMA at 1/8/2019  4:15 PM     Author: Thalia Echevarria CMA Service: -- Author Type: Certified Medical Assistant    Filed: 1/8/2019  4:15 PM Date of Service: 1/8/2019  4:15 PM Status: Signed    : Thalia Echevarria CMA (Certified Medical Assistant)    Encounter addended by: Thalia Echevarria CMA on: 1/8/2019  4:15 PM      Actions taken: Charge Capture section accepted

## 2021-07-03 NOTE — ADDENDUM NOTE
Addendum Note by Thalia Echevarria CMA at 6/5/2018 11:59 PM     Author: Thalia Echevarria CMA Service: -- Author Type: Certified Medical Assistant    Filed: 6/8/2018  9:17 AM Date of Service: 6/5/2018 11:59 PM Status: Signed    : Thalia Echevarria CMA (Certified Medical Assistant)    Encounter addended by: Thalia Echevarria CMA on: 6/8/2018  9:17 AM<BR>     Actions taken: Charge Capture section accepted